# Patient Record
Sex: FEMALE | Race: WHITE | NOT HISPANIC OR LATINO | URBAN - METROPOLITAN AREA
[De-identification: names, ages, dates, MRNs, and addresses within clinical notes are randomized per-mention and may not be internally consistent; named-entity substitution may affect disease eponyms.]

---

## 2021-11-29 ENCOUNTER — HISTORICAL (OUTPATIENT)
Dept: ADMINISTRATIVE | Facility: HOSPITAL | Age: 66
End: 2021-11-29

## 2021-11-29 LAB
ABS NEUT (OLG): 4.96 X10(3)/MCL (ref 2.1–9.2)
ALBUMIN SERPL-MCNC: 4.1 GM/DL (ref 3.4–4.8)
ALBUMIN/GLOB SERPL: 1.6 RATIO (ref 1.1–2)
ALP SERPL-CCNC: 50 UNIT/L (ref 40–150)
ALT SERPL-CCNC: 34 UNIT/L (ref 0–55)
APPEARANCE, UA: CLEAR
APTT PPP: 27.2 SECOND(S) (ref 23.2–33.7)
AST SERPL-CCNC: 37 UNIT/L (ref 5–34)
BACTERIA SPEC CULT: NORMAL /HPF
BASOPHILS # BLD AUTO: 0 X10(3)/MCL (ref 0–0.2)
BASOPHILS NFR BLD AUTO: 0 %
BILIRUB SERPL-MCNC: 0.9 MG/DL
BILIRUB UR QL STRIP: NEGATIVE
BILIRUBIN DIRECT+TOT PNL SERPL-MCNC: 0.4 MG/DL (ref 0–0.5)
BILIRUBIN DIRECT+TOT PNL SERPL-MCNC: 0.5 MG/DL (ref 0–0.8)
BUN SERPL-MCNC: 10.4 MG/DL (ref 9.8–20.1)
CALCIUM SERPL-MCNC: 9.7 MG/DL (ref 8.7–10.5)
CHLORIDE SERPL-SCNC: 105 MMOL/L (ref 98–107)
CO2 SERPL-SCNC: 20 MMOL/L (ref 23–31)
COLOR UR: YELLOW
CREAT SERPL-MCNC: 0.83 MG/DL (ref 0.55–1.02)
EOSINOPHIL # BLD AUTO: 0.1 X10(3)/MCL (ref 0–0.9)
EOSINOPHIL NFR BLD AUTO: 1 %
ERYTHROCYTE [DISTWIDTH] IN BLOOD BY AUTOMATED COUNT: 13 % (ref 11.5–17)
GLOBULIN SER-MCNC: 2.5 GM/DL (ref 2.4–3.5)
GLUCOSE (UA): NEGATIVE
GLUCOSE SERPL-MCNC: 79 MG/DL (ref 82–115)
HCT VFR BLD AUTO: 41.1 % (ref 37–47)
HGB BLD-MCNC: 14 GM/DL (ref 12–16)
HGB UR QL STRIP: NEGATIVE
INR PPP: 1 (ref 0–1.3)
KETONES UR QL STRIP: NEGATIVE
LEUKOCYTE ESTERASE UR QL STRIP: NEGATIVE
LYMPHOCYTES # BLD AUTO: 3.2 X10(3)/MCL (ref 0.6–4.6)
LYMPHOCYTES NFR BLD AUTO: 35 %
MCH RBC QN AUTO: 34.4 PG (ref 27–31)
MCHC RBC AUTO-ENTMCNC: 34.1 GM/DL (ref 33–36)
MCV RBC AUTO: 101 FL (ref 80–94)
MONOCYTES # BLD AUTO: 0.8 X10(3)/MCL (ref 0.1–1.3)
MONOCYTES NFR BLD AUTO: 9 %
MRSA SCREEN BY PCR: NEGATIVE
NEUTROPHILS # BLD AUTO: 4.96 X10(3)/MCL (ref 2.1–9.2)
NEUTROPHILS NFR BLD AUTO: 54 %
NITRITE UR QL STRIP: NEGATIVE
PH UR STRIP: 7 [PH] (ref 5–9)
PLATELET # BLD AUTO: 272 X10(3)/MCL (ref 130–400)
PMV BLD AUTO: 10.8 FL (ref 9.4–12.4)
POTASSIUM SERPL-SCNC: 4.1 MMOL/L (ref 3.5–5.1)
PROT SERPL-MCNC: 6.6 GM/DL (ref 5.8–7.6)
PROT UR QL STRIP: NEGATIVE
PROTHROMBIN TIME: 13.2 SECOND(S) (ref 12.5–14.5)
RBC # BLD AUTO: 4.07 X10(6)/MCL (ref 4.2–5.4)
RBC #/AREA URNS HPF: NORMAL /[HPF]
SARS-COV-2 RNA RESP QL NAA+PROBE: NOT DETECTED
SODIUM SERPL-SCNC: 135 MMOL/L (ref 136–145)
SP GR UR STRIP: 1.01 (ref 1–1.03)
SQUAMOUS EPITHELIAL, UA: NORMAL /HPF (ref 0–4)
UROBILINOGEN UR STRIP-ACNC: 0.2
WBC # SPEC AUTO: 9.1 X10(3)/MCL (ref 4.5–11.5)
WBC #/AREA URNS HPF: NORMAL /[HPF]

## 2022-04-10 ENCOUNTER — HISTORICAL (OUTPATIENT)
Dept: ADMINISTRATIVE | Facility: HOSPITAL | Age: 67
End: 2022-04-10
Payer: MEDICARE

## 2022-04-18 ENCOUNTER — HISTORICAL (OUTPATIENT)
Dept: CARDIOLOGY | Facility: HOSPITAL | Age: 67
End: 2022-04-18
Payer: MEDICARE

## 2022-04-18 ENCOUNTER — HISTORICAL (OUTPATIENT)
Dept: ADMINISTRATIVE | Facility: HOSPITAL | Age: 67
End: 2022-04-18
Payer: MEDICARE

## 2022-04-29 VITALS
SYSTOLIC BLOOD PRESSURE: 92 MMHG | HEIGHT: 65 IN | WEIGHT: 114.44 LBS | BODY MASS INDEX: 19.07 KG/M2 | DIASTOLIC BLOOD PRESSURE: 58 MMHG

## 2023-08-10 ENCOUNTER — ANESTHESIA EVENT (OUTPATIENT)
Dept: CARDIOLOGY | Facility: HOSPITAL | Age: 68
DRG: 035 | End: 2023-08-10
Payer: MEDICARE

## 2023-08-10 RX ORDER — LOSARTAN POTASSIUM 100 MG/1
100 TABLET ORAL NIGHTLY
Status: ON HOLD | COMMUNITY
End: 2023-08-19 | Stop reason: HOSPADM

## 2023-08-10 RX ORDER — LEVOTHYROXINE SODIUM 100 UG/1
100 TABLET ORAL
COMMUNITY

## 2023-08-10 RX ORDER — ESCITALOPRAM OXALATE 5 MG/1
5 TABLET ORAL NIGHTLY
COMMUNITY
Start: 2021-12-29

## 2023-08-10 RX ORDER — ALPRAZOLAM 0.5 MG/1
0.5 TABLET ORAL 2 TIMES DAILY PRN
COMMUNITY

## 2023-08-10 RX ORDER — ATORVASTATIN CALCIUM 40 MG/1
40 TABLET, FILM COATED ORAL NIGHTLY
COMMUNITY
Start: 2021-11-03

## 2023-08-10 RX ORDER — MEGESTROL ACETATE 40 MG/ML
20 SUSPENSION ORAL DAILY
COMMUNITY

## 2023-08-10 RX ORDER — PANTOPRAZOLE SODIUM 20 MG/1
20 TABLET, DELAYED RELEASE ORAL DAILY
COMMUNITY

## 2023-08-10 RX ORDER — ASPIRIN 81 MG/1
81 TABLET ORAL DAILY
COMMUNITY
Start: 2021-12-06

## 2023-08-10 RX ORDER — ALENDRONATE SODIUM 70 MG/1
70 TABLET ORAL
COMMUNITY

## 2023-08-16 ENCOUNTER — ANESTHESIA (OUTPATIENT)
Dept: CARDIOLOGY | Facility: HOSPITAL | Age: 68
DRG: 035 | End: 2023-08-16
Payer: MEDICARE

## 2023-08-16 ENCOUNTER — HOSPITAL ENCOUNTER (INPATIENT)
Facility: HOSPITAL | Age: 68
LOS: 3 days | Discharge: HOME HEALTH | DRG: 035 | End: 2023-08-19
Attending: INTERNAL MEDICINE | Admitting: INTERNAL MEDICINE
Payer: MEDICARE

## 2023-08-16 DIAGNOSIS — I65.22 OCCLUSION OF LEFT CAROTID ARTERY: ICD-10-CM

## 2023-08-16 DIAGNOSIS — Z01.818 PREOP TESTING: ICD-10-CM

## 2023-08-16 DIAGNOSIS — I25.10 CAD (CORONARY ARTERY DISEASE): ICD-10-CM

## 2023-08-16 LAB
POC ACTIVATED CLOTTING TIME K: 101 SEC (ref 74–137)
POCT GLUCOSE: 96 MG/DL (ref 70–110)
POCT GLUCOSE: 96 MG/DL (ref 70–110)
SAMPLE: NORMAL

## 2023-08-16 PROCEDURE — 37000008 HC ANESTHESIA 1ST 15 MINUTES: Performed by: INTERNAL MEDICINE

## 2023-08-16 PROCEDURE — D9220A PRA ANESTHESIA: Mod: CRNA,,,

## 2023-08-16 PROCEDURE — D9220A PRA ANESTHESIA: ICD-10-PCS | Mod: CRNA,,,

## 2023-08-16 PROCEDURE — 25000003 PHARM REV CODE 250: Performed by: NURSE PRACTITIONER

## 2023-08-16 PROCEDURE — D9220A PRA ANESTHESIA: Mod: ANES,,, | Performed by: ANESTHESIOLOGY

## 2023-08-16 PROCEDURE — 25000003 PHARM REV CODE 250

## 2023-08-16 PROCEDURE — 63600175 PHARM REV CODE 636 W HCPCS

## 2023-08-16 PROCEDURE — 27201423 OPTIME MED/SURG SUP & DEVICES STERILE SUPPLY: Performed by: INTERNAL MEDICINE

## 2023-08-16 PROCEDURE — 25000003 PHARM REV CODE 250: Performed by: INTERNAL MEDICINE

## 2023-08-16 PROCEDURE — D9220A PRA ANESTHESIA: ICD-10-PCS | Mod: ANES,,, | Performed by: ANESTHESIOLOGY

## 2023-08-16 PROCEDURE — C1753 CATH, INTRAVAS ULTRASOUND: HCPCS | Performed by: INTERNAL MEDICINE

## 2023-08-16 PROCEDURE — 93005 ELECTROCARDIOGRAM TRACING: CPT

## 2023-08-16 PROCEDURE — 35701 PR EXPLORATION, W/O SURG REPAIR, ARTERY, NECK, CAROTID/SUBCL: ICD-10-PCS | Mod: AS,LT,, | Performed by: PHYSICIAN ASSISTANT

## 2023-08-16 PROCEDURE — C1884 EMBOLIZATION PROTECT SYST: HCPCS | Performed by: INTERNAL MEDICINE

## 2023-08-16 PROCEDURE — 21400001 HC TELEMETRY ROOM

## 2023-08-16 PROCEDURE — C1769 GUIDE WIRE: HCPCS | Performed by: INTERNAL MEDICINE

## 2023-08-16 PROCEDURE — 37000009 HC ANESTHESIA EA ADD 15 MINS: Performed by: INTERNAL MEDICINE

## 2023-08-16 PROCEDURE — 35701 EXPL N/FLWD SURG NECK ART: CPT | Mod: LT,,, | Performed by: THORACIC SURGERY (CARDIOTHORACIC VASCULAR SURGERY)

## 2023-08-16 PROCEDURE — 11000001 HC ACUTE MED/SURG PRIVATE ROOM

## 2023-08-16 PROCEDURE — C1766 INTRO/SHEATH,STRBLE,NON-PEEL: HCPCS | Performed by: INTERNAL MEDICINE

## 2023-08-16 PROCEDURE — C1894 INTRO/SHEATH, NON-LASER: HCPCS | Performed by: INTERNAL MEDICINE

## 2023-08-16 PROCEDURE — 35701 EXPL N/FLWD SURG NECK ART: CPT | Mod: AS,LT,, | Performed by: PHYSICIAN ASSISTANT

## 2023-08-16 PROCEDURE — 63600175 PHARM REV CODE 636 W HCPCS: Performed by: ANESTHESIOLOGY

## 2023-08-16 PROCEDURE — 94761 N-INVAS EAR/PLS OXIMETRY MLT: CPT

## 2023-08-16 PROCEDURE — 35701 PR EXPLORATION, W/O SURG REPAIR, ARTERY, NECK, CAROTID/SUBCL: ICD-10-PCS | Mod: LT,,, | Performed by: THORACIC SURGERY (CARDIOTHORACIC VASCULAR SURGERY)

## 2023-08-16 RX ORDER — ACETAMINOPHEN 325 MG/1
650 TABLET ORAL EVERY 4 HOURS PRN
Status: DISCONTINUED | OUTPATIENT
Start: 2023-08-16 | End: 2023-08-19 | Stop reason: HOSPADM

## 2023-08-16 RX ORDER — FENTANYL CITRATE 50 UG/ML
INJECTION, SOLUTION INTRAMUSCULAR; INTRAVENOUS
Status: DISCONTINUED | OUTPATIENT
Start: 2023-08-16 | End: 2023-08-16

## 2023-08-16 RX ORDER — KETAMINE HYDROCHLORIDE 100 MG/ML
INJECTION, SOLUTION INTRAMUSCULAR; INTRAVENOUS
Status: DISCONTINUED | OUTPATIENT
Start: 2023-08-16 | End: 2023-08-16

## 2023-08-16 RX ORDER — CLOPIDOGREL BISULFATE 75 MG/1
75 TABLET ORAL DAILY
Status: DISCONTINUED | OUTPATIENT
Start: 2023-08-17 | End: 2023-08-19 | Stop reason: HOSPADM

## 2023-08-16 RX ORDER — ROCURONIUM BROMIDE 10 MG/ML
INJECTION, SOLUTION INTRAVENOUS
Status: DISCONTINUED | OUTPATIENT
Start: 2023-08-16 | End: 2023-08-16

## 2023-08-16 RX ORDER — KETAMINE HCL IN 0.9 % NACL 50 MG/5 ML
SYRINGE (ML) INTRAVENOUS
Status: DISPENSED
Start: 2023-08-16 | End: 2023-08-16

## 2023-08-16 RX ORDER — HYDROMORPHONE HYDROCHLORIDE 2 MG/ML
0.2 INJECTION, SOLUTION INTRAMUSCULAR; INTRAVENOUS; SUBCUTANEOUS EVERY 5 MIN PRN
Status: DISCONTINUED | OUTPATIENT
Start: 2023-08-16 | End: 2023-08-16 | Stop reason: HOSPADM

## 2023-08-16 RX ORDER — HYDRALAZINE HYDROCHLORIDE 20 MG/ML
INJECTION INTRAMUSCULAR; INTRAVENOUS
Status: COMPLETED
Start: 2023-08-16 | End: 2023-08-16

## 2023-08-16 RX ORDER — PROPOFOL 10 MG/ML
VIAL (ML) INTRAVENOUS CONTINUOUS PRN
Status: DISCONTINUED | OUTPATIENT
Start: 2023-08-16 | End: 2023-08-16

## 2023-08-16 RX ORDER — ATROPINE SULFATE 0.1 MG/ML
INJECTION INTRAVENOUS
Status: DISPENSED
Start: 2023-08-16 | End: 2023-08-16

## 2023-08-16 RX ORDER — PROPOFOL 10 MG/ML
VIAL (ML) INTRAVENOUS
Status: DISCONTINUED | OUTPATIENT
Start: 2023-08-16 | End: 2023-08-16

## 2023-08-16 RX ORDER — CLOPIDOGREL BISULFATE 75 MG/1
75 TABLET ORAL
Status: ACTIVE | OUTPATIENT
Start: 2023-08-16

## 2023-08-16 RX ORDER — ACETAMINOPHEN 10 MG/ML
1000 INJECTION, SOLUTION INTRAVENOUS ONCE
Status: COMPLETED | OUTPATIENT
Start: 2023-08-16 | End: 2023-08-16

## 2023-08-16 RX ORDER — CEFAZOLIN SODIUM 1 G/3ML
INJECTION, POWDER, FOR SOLUTION INTRAMUSCULAR; INTRAVENOUS
Status: DISCONTINUED | OUTPATIENT
Start: 2023-08-16 | End: 2023-08-16

## 2023-08-16 RX ORDER — ESCITALOPRAM OXALATE 5 MG/1
5 TABLET ORAL NIGHTLY
Status: DISCONTINUED | OUTPATIENT
Start: 2023-08-16 | End: 2023-08-19 | Stop reason: HOSPADM

## 2023-08-16 RX ORDER — SODIUM CHLORIDE 9 MG/ML
INJECTION, SOLUTION INTRAVENOUS CONTINUOUS
Status: ACTIVE | OUTPATIENT
Start: 2023-08-16 | End: 2023-08-16

## 2023-08-16 RX ORDER — CLOPIDOGREL BISULFATE 75 MG/1
75 TABLET ORAL DAILY
COMMUNITY

## 2023-08-16 RX ORDER — ONDANSETRON 4 MG/1
8 TABLET, ORALLY DISINTEGRATING ORAL EVERY 8 HOURS PRN
Status: DISCONTINUED | OUTPATIENT
Start: 2023-08-16 | End: 2023-08-19 | Stop reason: HOSPADM

## 2023-08-16 RX ORDER — SODIUM CHLORIDE, SODIUM GLUCONATE, SODIUM ACETATE, POTASSIUM CHLORIDE AND MAGNESIUM CHLORIDE 30; 37; 368; 526; 502 MG/100ML; MG/100ML; MG/100ML; MG/100ML; MG/100ML
INJECTION, SOLUTION INTRAVENOUS CONTINUOUS
Status: DISCONTINUED | OUTPATIENT
Start: 2023-08-16 | End: 2023-08-19 | Stop reason: HOSPADM

## 2023-08-16 RX ORDER — ASPIRIN 81 MG/1
81 TABLET ORAL DAILY
Status: DISCONTINUED | OUTPATIENT
Start: 2023-08-17 | End: 2023-08-19 | Stop reason: HOSPADM

## 2023-08-16 RX ORDER — LIDOCAINE HYDROCHLORIDE 20 MG/ML
INJECTION, SOLUTION EPIDURAL; INFILTRATION; INTRACAUDAL; PERINEURAL
Status: DISCONTINUED | OUTPATIENT
Start: 2023-08-16 | End: 2023-08-16

## 2023-08-16 RX ORDER — LIDOCAINE HYDROCHLORIDE 10 MG/ML
1 INJECTION, SOLUTION EPIDURAL; INFILTRATION; INTRACAUDAL; PERINEURAL ONCE
Status: DISCONTINUED | OUTPATIENT
Start: 2023-08-16 | End: 2023-08-16 | Stop reason: HOSPADM

## 2023-08-16 RX ORDER — ONDANSETRON 2 MG/ML
4 INJECTION INTRAMUSCULAR; INTRAVENOUS DAILY PRN
Status: DISCONTINUED | OUTPATIENT
Start: 2023-08-16 | End: 2023-08-16 | Stop reason: HOSPADM

## 2023-08-16 RX ORDER — PSEUDOEPHEDRINE HCL 30 MG
60 TABLET ORAL 3 TIMES DAILY PRN
Status: DISCONTINUED | OUTPATIENT
Start: 2023-08-16 | End: 2023-08-19 | Stop reason: HOSPADM

## 2023-08-16 RX ORDER — ESCITALOPRAM OXALATE 5 MG/1
TABLET ORAL
Status: DISPENSED
Start: 2023-08-16 | End: 2023-08-17

## 2023-08-16 RX ORDER — ASPIRIN 81 MG/1
81 TABLET ORAL
Status: ACTIVE | OUTPATIENT
Start: 2023-08-16

## 2023-08-16 RX ORDER — DIPHENHYDRAMINE HYDROCHLORIDE 50 MG/ML
25 INJECTION INTRAMUSCULAR; INTRAVENOUS EVERY 6 HOURS PRN
Status: DISCONTINUED | OUTPATIENT
Start: 2023-08-16 | End: 2023-08-16 | Stop reason: HOSPADM

## 2023-08-16 RX ORDER — ALPRAZOLAM 0.5 MG/1
0.5 TABLET ORAL 2 TIMES DAILY PRN
Status: DISCONTINUED | OUTPATIENT
Start: 2023-08-16 | End: 2023-08-19 | Stop reason: HOSPADM

## 2023-08-16 RX ORDER — MIDAZOLAM HYDROCHLORIDE 1 MG/ML
2 INJECTION INTRAMUSCULAR; INTRAVENOUS ONCE AS NEEDED
Status: DISCONTINUED | OUTPATIENT
Start: 2023-08-16 | End: 2023-08-16 | Stop reason: HOSPADM

## 2023-08-16 RX ADMIN — ACETAMINOPHEN 650 MG: 325 TABLET, FILM COATED ORAL at 02:08

## 2023-08-16 RX ADMIN — ACETAMINOPHEN 1000 MG: 10 INJECTION, SOLUTION INTRAVENOUS at 10:08

## 2023-08-16 RX ADMIN — SODIUM CHLORIDE, SODIUM GLUCONATE, SODIUM ACETATE, POTASSIUM CHLORIDE AND MAGNESIUM CHLORIDE: 526; 502; 368; 37; 30 INJECTION, SOLUTION INTRAVENOUS at 07:08

## 2023-08-16 RX ADMIN — KETAMINE HYDROCHLORIDE 20 MG: 100 INJECTION, SOLUTION, CONCENTRATE INTRAMUSCULAR; INTRAVENOUS at 08:08

## 2023-08-16 RX ADMIN — LIDOCAINE HYDROCHLORIDE 50 MG: 20 INJECTION, SOLUTION EPIDURAL; INFILTRATION; INTRACAUDAL; PERINEURAL at 07:08

## 2023-08-16 RX ADMIN — FENTANYL CITRATE 25 MCG: 50 INJECTION, SOLUTION INTRAMUSCULAR; INTRAVENOUS at 07:08

## 2023-08-16 RX ADMIN — PROPOFOL 30 MG: 10 INJECTION, EMULSION INTRAVENOUS at 07:08

## 2023-08-16 RX ADMIN — SUGAMMADEX 200 MG: 100 INJECTION, SOLUTION INTRAVENOUS at 08:08

## 2023-08-16 RX ADMIN — ESCITALOPRAM OXALATE 5 MG: 5 TABLET, FILM COATED ORAL at 09:08

## 2023-08-16 RX ADMIN — HYDRALAZINE HYDROCHLORIDE 10 MG: 20 INJECTION INTRAMUSCULAR; INTRAVENOUS at 08:08

## 2023-08-16 RX ADMIN — ROCURONIUM BROMIDE 50 MG: 10 SOLUTION INTRAVENOUS at 07:08

## 2023-08-16 RX ADMIN — SODIUM CHLORIDE: 9 INJECTION, SOLUTION INTRAVENOUS at 10:08

## 2023-08-16 RX ADMIN — CEFAZOLIN 1 G: 330 INJECTION, POWDER, FOR SOLUTION INTRAMUSCULAR; INTRAVENOUS at 08:08

## 2023-08-16 RX ADMIN — PROPOFOL 50 MCG/KG/MIN: 10 INJECTION, EMULSION INTRAVENOUS at 07:08

## 2023-08-16 RX ADMIN — PHENYLEPHRINE HYDROCHLORIDE 40 MCG/KG/MIN: 10 INJECTION INTRAVENOUS at 08:08

## 2023-08-16 RX ADMIN — FENTANYL CITRATE 50 MCG: 50 INJECTION, SOLUTION INTRAMUSCULAR; INTRAVENOUS at 08:08

## 2023-08-16 NOTE — Clinical Note
The site was marked. The groin, left chest and left neck was prepped. The site was prepped with ChloraPrep. The site was clipped. The patient was draped.

## 2023-08-16 NOTE — Clinical Note
L carotid artery exposed, artery too calcified to proceed with procedure in cath lab. Procedure aborted per MD.

## 2023-08-16 NOTE — ANESTHESIA PROCEDURE NOTES
Intubation    Date/Time: 8/16/2023 8:02 AM    Performed by: Clay Sheldon CRNA  Authorized by: Avtar Little MD    Intubation:     Induction:  Intravenous    Intubated:  Postinduction    Mask Ventilation:  Easy mask    Attempts:  1    Attempted By:  Student    Method of Intubation:  Video laryngoscopy    Blade:  Fry 3    Laryngeal View Grade: Grade I - full view of cords      Difficult Airway Encountered?: No      Complications:  None    Airway Device:  Oral endotracheal tube    Airway Device Size:  6.5    Style/Cuff Inflation:  Cuffed (inflated to minimal occlusive pressure)    Tube secured:  21    Secured at:  The lips    Placement Verified By:  Capnometry    Complicating Factors:  None    Findings Post-Intubation:  BS equal bilateral and atraumatic/condition of teeth unchanged

## 2023-08-16 NOTE — OP NOTE
[facility] - [facility] Medical Telemetry  Cardiothoracic Surgery  Operative Note    SUMMARY     Date of Procedure: 8/16/2023     Procedure:  1.  Attempted left carotid TCAR procedure    Co-Surgeon: FEI Sanford / Mio     Assistant: Jayy Vegas    Pre-Operative Diagnosis:  Severe bilateral internal carotid artery stenosis    Post-Operative Diagnosis:  Severe bilateral internal carotid artery stenosis with severe left carotid calcification down to a level of just below the clavicle and the common carotid artery.    Anesthesia: General    Operative Findings (including complications, if any):  Patient with severe anterior calcification of the common carotid artery obviating the possibility of access for left carotid TCAR procedure    Description of Technical Procedures:  Patient was lived to the operating room, support lines were placed, general endotracheal anesthesia was induced.  Patient was prepped and draped usual sterile fashion.  A 2 cm incision was made just above the clavicle and anterior to the left common carotid artery.  Subcutaneous tissues were dissected with the Bovie electric cautery.  The strap muscles were split along the direction of the fibers and the common carotid artery was identified.  Patient was found to have a hard calcification anterior on the common carotid artery which extended into the chest.  This obviating the possibility of access for transcarotid arterial revascularization and the procedure was aborted due to safety concerns.  Wounds were irrigated with saline and closed in layers with Vicryl.  Patient tolerated the procedure well be delivered to the recovery room in stable condition.    Estimated Blood Loss (EBL):  5 mL          Specimens:   Specimen (24h ago, onward)      None                    Condition: Stable    Disposition: PACU - hemodynamically stable.

## 2023-08-16 NOTE — NURSING
Nurses Note -- 4 Eyes      8/16/2023   11:43 AM      Skin assessed during: Admit      [x] No Altered Skin Integrity Present    [x]Prevention Measures Documented      [] Yes- Altered Skin Integrity Present or Discovered   [] LDA Added if Not in Epic (Describe Wound)   [] New Altered Skin Integrity was Present on Admit and Documented in LDA   [] Wound Image Taken    Wound Care Consulted? No    Attending Nurse:   Torri RAMIREZ RN    Second RN/Staff Member:   Keesha Lr RN

## 2023-08-16 NOTE — ANESTHESIA PREPROCEDURE EVALUATION
08/16/2023  Magalis Bridges is a 67 y.o., female with multiple medical problems including CAD s/p CABG, and sever carotid stenosis, worse on the left.  She is here today for carotid stent on left and angiogram of right carotid in cath lab.  Severely reduced LV EF of 25 % noted.  She is in good spirits in holding, laughing and joking with family, a bit hard of hearing.  She does appear somewhat frail, but asks appropriate questions.  No cardiac complaints.  Breathing well today.      Pre-op Assessment    I have reviewed the Patient Summary Reports.     I have reviewed the Nursing Notes. I have reviewed the NPO Status.   I have reviewed the Medications.     Review of Systems  Anesthesia Hx:  No problems with previous Anesthesia  Denies Family Hx of Anesthesia complications.   Denies Personal Hx of Anesthesia complications.   Hematology/Oncology:         -- Anemia:   Cardiovascular:   Exercise tolerance: poor Hypertension CAD  CABG/stent  PVD    Pulmonary:  Pulmonary Normal    Hepatic/GI:   GERD        Physical Exam  General: Well nourished, Cooperative, Alert and Oriented    Airway:  Mallampati: II   Mouth Opening: Normal  TM Distance: Normal  Tongue: Normal  Neck ROM: Normal ROM    Dental:  Periodontal disease, Loose teeth  Missing teeth.  Poor dentition.  Chest/Lungs:  Clear to auscultation, Normal Respiratory Rate    Heart:  Rate: Normal  Rhythm: Regular Rhythm        Anesthesia Plan  Type of Anesthesia, risks & benefits discussed:    Anesthesia Type: Gen ETT  Intra-op Monitoring Plan: Standard ASA Monitors and Art Line  Post Op Pain Control Plan: multimodal analgesia  Induction:  IV  Informed Consent: Informed consent signed with the Patient and all parties understand the risks and agree with anesthesia plan.  All questions answered. Patient consented to blood products? Yes  ASA Score: 4  Day of Surgery  Review of History & Physical: H&P Update referred to the surgeon/provider.  Anesthesia Plan Notes: TIVA.    Ready For Surgery From Anesthesia Perspective.     .

## 2023-08-16 NOTE — CONSULTS
[facility] - [facility] Telemetry  Cardiothoracic Surgery  Consult Note    Patient Name: Magalis Bridges  MRN: [id number]  Admission Date: 8/16/2023  Attending Physician: Kulwant Lieberman MD  Referring Provider: Olayinka Garcia MD    Patient information was obtained from patient, past medical records, and primary team.     Inpatient consult to Cardiothoracic Surgery  Consult performed by: Cheikh Sanford MD  Consult ordered by: Kulwant Lieberman MD  Reason for consult: Bilateral internal carotid artery stenosis  Assessment/Recommendations: The patient has severe bilateral internal carotid artery stenosis with heavy calcification.  Recommend left TCAR procedure followed by possible right TCAR procedure.  Risks, benefits, and alternatives have been discussed.  Questions have been answered.  Patient voices understanding and agrees to proceed.        Subjective:     Chief Complaint/Reason for Admission:  Bilateral internal carotid artery stenosis    History of Present Illness:  Patient is a 67-year-old woman with a history of coronary artery disease having had coronary artery bypass grafting now comes with significant ultrasound evidence bilateral internal carotid artery stenosis.  Left internal carotid artery has been found to have a peak systolic velocity of 286 centimeters/second.  CTA is indicative of bilateral greater than 70% internal carotid artery stenoses     The patient denies fever, chills, nausea, vomiting, headache, syncope, chest pain, back pain, or palpitations.    No current facility-administered medications on file prior to encounter.     Current Outpatient Medications on File Prior to Encounter   Medication Sig    alendronate (FOSAMAX) 70 MG tablet Take 70 mg by mouth every 7 days.    ALPRAZolam (XANAX) 0.5 MG tablet Take 0.5 mg by mouth 2 (two) times daily as needed for Anxiety.    aspirin (ECOTRIN) 81 MG EC tablet Take 81 mg by mouth once daily.    atorvastatin (LIPITOR) 40 MG tablet Take 40  mg by mouth every evening.    clopidogreL (PLAVIX) 75 mg tablet Take 75 mg by mouth once daily.    EScitalopram oxalate (LEXAPRO) 5 MG Tab Take 5 mg by mouth nightly.    levothyroxine (SYNTHROID) 100 MCG tablet Take 100 mcg by mouth before breakfast.    losartan (COZAAR) 100 MG tablet Take 100 mg by mouth every evening.    megestroL (MEGACE) 400 mg/10 mL (40 mg/mL) Susp Take 20 mLs by mouth once daily.    metoprolol succinate 25 mg CSpX Take 75 mg by mouth once daily.    pantoprazole (PROTONIX) 20 MG tablet Take 20 mg by mouth once daily.    rivaroxaban (XARELTO) 10 mg Tab Take 10 mg by mouth daily with dinner or evening meal.       Review of patient's allergies indicates:   Allergen Reactions    Codeine Nausea Only       Past Medical History:   Diagnosis Date    Anxiety     Appetite loss     Arthritis     Carotid arterial disease     Coronary artery disease     DVT (deep venous thrombosis) 11/2021    GERD (gastroesophageal reflux disease)     HLD (hyperlipidemia)     Hypertension     Osteoporosis     Thyroid disease      Past Surgical History:   Procedure Laterality Date    CATARACT EXTRACTION Bilateral     CORONARY ARTERY BYPASS GRAFT  2021    INSERTION OF BIVENTRICULAR IMPLANTABLE CARDIOVERTER-DEFIBRILLATOR (ICD)      TONSILLECTOMY       Family History    None       Tobacco Use    Smoking status: Every Day     Current packs/day: 1.00     Types: Cigarettes    Smokeless tobacco: Never   Substance and Sexual Activity    Alcohol use: Yes     Alcohol/week: 14.0 - 21.0 standard drinks of alcohol     Types: 14 - 21 Cans of beer per week     Comment: 3-4 beers/day    Drug use: Yes     Types: Marijuana    Sexual activity: Not Currently     Review of Systems   Constitutional:  Negative for chills, diaphoresis, fatigue and fever.   HENT:  Negative for dental problem.    Respiratory:  Negative for cough, chest tightness, shortness of breath, wheezing and stridor.    Cardiovascular:  Negative for chest pain, palpitations and  leg swelling.   Gastrointestinal:  Negative for abdominal distention, abdominal pain, constipation, diarrhea, nausea and vomiting.   Musculoskeletal:  Negative for back pain, neck pain and neck stiffness.   Skin:  Negative for wound.   Neurological:  Negative for dizziness, weakness, numbness and headaches.   Hematological:  Negative for adenopathy.     Objective:     Vital Signs (Most Recent):  Temp: 98.4 °F (36.9 °C) (08/16/23 1132)  Pulse: 95 (08/16/23 1132)  Resp: 18 (08/16/23 1132)  BP: (!) 94/51 (08/16/23 1132)  SpO2: 96 % (08/16/23 1132) Vital Signs (24h Range):  Temp:  [97.6 °F (36.4 °C)-98.4 °F (36.9 °C)] 98.4 °F (36.9 °C)  Pulse:  [77-95] 95  Resp:  [16-23] 18  SpO2:  [93 %-99 %] 96 %  BP: ()/(48-82) 94/51  Arterial Line BP: (138-229)/(46-72) 138/46     Weight: 57.4 kg (126 lb 8.7 oz)  Body mass index is 20.74 kg/m².    SpO2: 96 %        Intake/Output - Last 3 Shifts         08/14 0700  08/15 0659 08/15 0700  08/16 0659 08/16 0700 08/17 0659    IV Piggyback   400    Total Intake(mL/kg)   400 (7)    Net   +400                    Lines/Drains/Airways       Peripheral Intravenous Line  Duration                  Peripheral IV - Single Lumen 08/16/23 0630 20 G Anterior;Distal;Right Forearm <1 day         Peripheral IV - Single Lumen 08/16/23 0805 20 G Left Hand <1 day                     STS Risk Score: n/a    Physical Exam  Vitals and nursing note reviewed.   Constitutional:       General: She is not in acute distress.     Appearance: Normal appearance.   HENT:      Head: Normocephalic and atraumatic.      Nose: Nose normal. No congestion.      Mouth/Throat:      Mouth: Mucous membranes are moist.   Eyes:      General: No scleral icterus.     Extraocular Movements: Extraocular movements intact.      Conjunctiva/sclera: Conjunctivae normal.      Pupils: Pupils are equal, round, and reactive to light.   Neck:      Thyroid: No thyroid mass or thyromegaly.      Vascular: Carotid bruit (Bilateral) present.  "No JVD.   Cardiovascular:      Rate and Rhythm: Normal rate and regular rhythm.      Pulses: Normal pulses.           Carotid pulses are 2+ on the right side and 2+ on the left side.       Radial pulses are 2+ on the right side and 2+ on the left side.        Femoral pulses are 2+ on the right side and 2+ on the left side.       Dorsalis pedis pulses are 2+ on the right side and 2+ on the left side.        Posterior tibial pulses are 2+ on the right side and 2+ on the left side.      Heart sounds: No murmur heard.     No friction rub. No gallop.   Pulmonary:      Effort: Pulmonary effort is normal. No respiratory distress.      Breath sounds: Normal breath sounds. No stridor. No wheezing, rhonchi or rales.   Chest:      Chest wall: No tenderness.   Abdominal:      General: Abdomen is flat. Bowel sounds are normal. There is no distension.      Palpations: Abdomen is soft. There is no hepatomegaly, splenomegaly, mass or pulsatile mass.      Tenderness: There is no abdominal tenderness. There is no rebound.   Musculoskeletal:         General: No swelling. Normal range of motion.      Cervical back: Normal range of motion. No rigidity or tenderness.      Right lower leg: No edema.      Left lower leg: No edema.   Lymphadenopathy:      Cervical: No cervical adenopathy.      Upper Body:      Right upper body: No supraclavicular or axillary adenopathy.      Left upper body: No supraclavicular or axillary adenopathy.   Skin:     General: Skin is warm and dry.   Neurological:      General: No focal deficit present.      Mental Status: She is alert and oriented to person, place, and time. Mental status is at baseline.      Cranial Nerves: No cranial nerve deficit.      Sensory: No sensory deficit.      Motor: No weakness.      Gait: Gait normal.   Psychiatric:         Mood and Affect: Mood normal.         Significant Labs:  BMP: No results for input(s): "GLU", "NA", "K", "CL", "CO2", "BUN", "CREATININE", "CALCIUM", "MG" in " "the last 48 hours.  CBC: No results for input(s): "WBC", "RBC", "HGB", "HCT", "PLT", "MCV", "MCH", "MCHC" in the last 48 hours.  All pertinent labs from the last 24 hours have been reviewed.    Significant Diagnostics:  CT: I have reviewed all pertinent results/findings within the past 24 hours  U/S: I have reviewed all pertinent results/findings within the past 24 hours  I have reviewed and interpreted all pertinent imaging results/findings within the past 24 hours.    Assessment/Plan:     NYHA Score: NYHA I: cardiac disease, but without resulting limitations of physical activity    There are no hospital problems to display for this patient.      Thank you for your consult. I will follow-up with patient. Please contact us if you have any additional questions.    Cheikh Sanford MD  Cardiothoracic Surgery  [facility] - [facility] Tele[facility]  "

## 2023-08-16 NOTE — LETTER
August 21, 2023        Olayinka Garcia MD  [address]  [place] [address]                [address] [facility] [address]   Patient: Magalis Bridges   MR Number: [id number]   YOB: 1955   Date of Visit: 8/9/2023       This patient needs a follow up appointment. Please schedule an appointment for this pt and contact the patient with appointment date and time.

## 2023-08-16 NOTE — TRANSFER OF CARE
"Anesthesia Transfer of Care Note    Patient: Magalis Bridges    Procedure(s) Performed: Procedure(s) (LRB):  Stent, Carotid W/ Protect (N/A)    Patient location: PACU    Anesthesia Type: general    Transport from OR: Transported from OR on room air with adequate spontaneous ventilation    Post pain: adequate analgesia    Post assessment: no apparent anesthetic complications    Post vital signs: stable    Level of consciousness: awake and alert    Nausea/Vomiting: no nausea/vomiting    Complications: none    Transfer of care protocol was followed      Last vitals:   Visit Vitals  BP (!) 166/82   Pulse 77   Temp 36.4 °C (97.6 °F) (Oral)   Ht 5' 5.5" (1.664 m)   Wt 57.4 kg (126 lb 8.7 oz)   LMP  (LMP Unknown)   SpO2 99%   Breastfeeding No   BMI 20.74 kg/m²     "

## 2023-08-16 NOTE — Clinical Note
The catheter was inserted into the  proximal left common carotid artery. Catheter advanced for angio Left ICA.

## 2023-08-16 NOTE — Clinical Note
The catheter was inserted into the and was inserted over the wire into the. Into common cartoid for angio

## 2023-08-16 NOTE — PLAN OF CARE
08/16/23 1517   Discharge Assessment   Assessment Type Discharge Planning Assessment   Confirmed/corrected address, phone number and insurance Yes   Confirmed Demographics Correct on Facesheet   Source of Information patient;family   Communicated EDIE with patient/caregiver Date not available/Unable to determine   Reason For Admission Occlusion of left carotid artery   People in Home alone   Do you expect to return to your current living situation? Yes   Do you have help at home or someone to help you manage your care at home? No   Prior to hospitilization cognitive status: Unable to Assess   Current cognitive status: Alert/Oriented   Walking or Climbing Stairs ambulation difficulty, requires equipment   Mobility Management rolling walker, wheel chair   Dressing/Bathing bathing difficulty, requires equipment   Dressing/Bathing Management Bath bench   Home Accessibility stairs to enter home   Number of Stairs, Main Entrance three   Home Layout Able to live on 1st floor   Equipment Currently Used at Home bath bench;bedside commode;wheelchair;walker, rolling   Do you currently have service(s) that help you manage your care at home? Yes   Name and Contact number of agency [facility]   Is the pt/caregiver preference to resume services with current agency Yes   Do you take prescription medications? Yes   Do you have prescription coverage? Yes   Coverage medicare   Who is going to help you get home at discharge? [facility] [phone]   How do you get to doctors appointments? family or friend will provide   Are you on dialysis? No   Do you take coumadin? No   DME Needed Upon Discharge  none   Discharge Plan discussed with: Patient   Transition of Care Barriers None   Discharge Plan A Home Health   Discharge Plan B Home Health   Physical Activity   On average, how many days per week do you engage in moderate to strenuous exercise (like a brisk walk)? 0 days   On average, how many minutes do you engage in  exercise at this level? 0 min   Financial Resource Strain   How hard is it for you to pay for the very basics like food, housing, medical care, and heating? Not hard   Housing Stability   In the last 12 months, was there a time when you were not able to pay the mortgage or rent on time? N   In the last 12 months, how many places have you lived? 1   In the last 12 months, was there a time when you did not have a steady place to sleep or slept in a shelter (including now)? N   Transportation Needs   In the past 12 months, has lack of transportation kept you from medical appointments or from getting medications? no   In the past 12 months, has lack of transportation kept you from meetings, work, or from getting things needed for daily living? No   Food Insecurity   Within the past 12 months, you worried that your food would run out before you got the money to buy more. Never true   Within the past 12 months, the food you bought just didn't last and you didn't have money to get more. Never true   Stress   Do you feel stress - tense, restless, nervous, or anxious, or unable to sleep at night because your mind is troubled all the time - these days? Not at all   Social Connections   In a typical week, how many times do you talk on the phone with family, friends, or neighbors? More than 3   How often do you get together with friends or relatives? More than 3   How often do you attend Restorationist or Latter day services? Never   Do you belong to any clubs or organizations such as Restorationist groups, unions, fraternal or athletic groups, or school groups? No   How often do you attend meetings of the clubs or organizations you belong to? Never   Are you , , , , never , or living with a partner? Never marrie   Alcohol Use   Q1: How often do you have a drink containing alcohol? 4 or more ti   Q2: How many drinks containing alcohol do you have on a typical day when you are drinking? 3 or 4   Q3: How  often do you have six or more drinks on one occasion? Never   Patient states she is current with [facility]. Cm called agency, end of episode was 8/12. Patient will need new home health orders on discharge. She would like to continue services with [facility].

## 2023-08-17 LAB
ANION GAP SERPL CALC-SCNC: 9 MEQ/L
BASOPHILS # BLD AUTO: 0.03 X10(3)/MCL
BASOPHILS NFR BLD AUTO: 0.3 %
BUN SERPL-MCNC: 10.9 MG/DL (ref 9.8–20.1)
CALCIUM SERPL-MCNC: 8.5 MG/DL (ref 8.4–10.2)
CHLORIDE SERPL-SCNC: 106 MMOL/L (ref 98–107)
CO2 SERPL-SCNC: 19 MMOL/L (ref 23–31)
CREAT SERPL-MCNC: 0.76 MG/DL (ref 0.55–1.02)
CREAT/UREA NIT SERPL: 14
EOSINOPHIL # BLD AUTO: 0.07 X10(3)/MCL (ref 0–0.9)
EOSINOPHIL NFR BLD AUTO: 0.8 %
ERYTHROCYTE [DISTWIDTH] IN BLOOD BY AUTOMATED COUNT: 13.5 % (ref 11.5–17)
GFR SERPLBLD CREATININE-BSD FMLA CKD-EPI: >60 MLS/MIN/1.73/M2
GLUCOSE SERPL-MCNC: 131 MG/DL (ref 70–110)
GLUCOSE SERPL-MCNC: 91 MG/DL (ref 82–115)
HCT VFR BLD AUTO: 34.7 % (ref 37–47)
HGB BLD-MCNC: 11.9 G/DL (ref 12–16)
IMM GRANULOCYTES # BLD AUTO: 0.1 X10(3)/MCL (ref 0–0.04)
IMM GRANULOCYTES NFR BLD AUTO: 1.1 %
LYMPHOCYTES # BLD AUTO: 2.45 X10(3)/MCL (ref 0.6–4.6)
LYMPHOCYTES NFR BLD AUTO: 27.8 %
MCH RBC QN AUTO: 33.3 PG (ref 27–31)
MCHC RBC AUTO-ENTMCNC: 34.3 G/DL (ref 33–36)
MCV RBC AUTO: 97.2 FL (ref 80–94)
MONOCYTES # BLD AUTO: 0.85 X10(3)/MCL (ref 0.1–1.3)
MONOCYTES NFR BLD AUTO: 9.6 %
NEUTROPHILS # BLD AUTO: 5.31 X10(3)/MCL (ref 2.1–9.2)
NEUTROPHILS NFR BLD AUTO: 60.4 %
NRBC BLD AUTO-RTO: 0 %
PLATELET # BLD AUTO: 257 X10(3)/MCL (ref 130–400)
PMV BLD AUTO: 10.2 FL (ref 7.4–10.4)
POCT GLUCOSE: 96 MG/DL (ref 70–110)
POTASSIUM SERPL-SCNC: 4.4 MMOL/L (ref 3.5–5.1)
RBC # BLD AUTO: 3.57 X10(6)/MCL (ref 4.2–5.4)
SODIUM SERPL-SCNC: 134 MMOL/L (ref 136–145)
WBC # SPEC AUTO: 8.81 X10(3)/MCL (ref 4.5–11.5)

## 2023-08-17 PROCEDURE — 85025 COMPLETE CBC W/AUTO DIFF WBC: CPT | Performed by: INTERNAL MEDICINE

## 2023-08-17 PROCEDURE — 99152 MOD SED SAME PHYS/QHP 5/>YRS: CPT | Performed by: INTERNAL MEDICINE

## 2023-08-17 PROCEDURE — 37215 TRANSCATH STENT CCA W/EPS: CPT | Performed by: INTERNAL MEDICINE

## 2023-08-17 PROCEDURE — 27000221 HC OXYGEN, UP TO 24 HOURS

## 2023-08-17 PROCEDURE — C1725 CATH, TRANSLUMIN NON-LASER: HCPCS | Performed by: INTERNAL MEDICINE

## 2023-08-17 PROCEDURE — C1769 GUIDE WIRE: HCPCS | Performed by: INTERNAL MEDICINE

## 2023-08-17 PROCEDURE — C1894 INTRO/SHEATH, NON-LASER: HCPCS | Performed by: INTERNAL MEDICINE

## 2023-08-17 PROCEDURE — 25000003 PHARM REV CODE 250: Performed by: NURSE PRACTITIONER

## 2023-08-17 PROCEDURE — 25500020 PHARM REV CODE 255: Performed by: INTERNAL MEDICINE

## 2023-08-17 PROCEDURE — 99153 MOD SED SAME PHYS/QHP EA: CPT | Performed by: INTERNAL MEDICINE

## 2023-08-17 PROCEDURE — 25000003 PHARM REV CODE 250: Performed by: INTERNAL MEDICINE

## 2023-08-17 PROCEDURE — 80048 BASIC METABOLIC PNL TOTAL CA: CPT | Performed by: INTERNAL MEDICINE

## 2023-08-17 PROCEDURE — C1887 CATHETER, GUIDING: HCPCS | Performed by: INTERNAL MEDICINE

## 2023-08-17 PROCEDURE — 94761 N-INVAS EAR/PLS OXIMETRY MLT: CPT

## 2023-08-17 PROCEDURE — 63600175 PHARM REV CODE 636 W HCPCS: Performed by: NURSE PRACTITIONER

## 2023-08-17 PROCEDURE — 27201423 OPTIME MED/SURG SUP & DEVICES STERILE SUPPLY: Performed by: INTERNAL MEDICINE

## 2023-08-17 PROCEDURE — C1876 STENT, NON-COA/NON-COV W/DEL: HCPCS | Performed by: INTERNAL MEDICINE

## 2023-08-17 PROCEDURE — C1884 EMBOLIZATION PROTECT SYST: HCPCS | Performed by: INTERNAL MEDICINE

## 2023-08-17 PROCEDURE — 21400001 HC TELEMETRY ROOM

## 2023-08-17 PROCEDURE — 63600175 PHARM REV CODE 636 W HCPCS: Performed by: INTERNAL MEDICINE

## 2023-08-17 DEVICE — XACT CAROTID STENT SYSTEM 9.0 MM X 40 MM TAPERED
Type: IMPLANTABLE DEVICE | Site: NECK | Status: FUNCTIONAL
Brand: XACT

## 2023-08-17 RX ORDER — LIDOCAINE HYDROCHLORIDE 10 MG/ML
INJECTION, SOLUTION EPIDURAL; INFILTRATION; INTRACAUDAL; PERINEURAL
Status: DISCONTINUED | OUTPATIENT
Start: 2023-08-17 | End: 2023-08-17 | Stop reason: HOSPADM

## 2023-08-17 RX ORDER — METOPROLOL TARTRATE 1 MG/ML
INJECTION, SOLUTION INTRAVENOUS
Status: DISCONTINUED | OUTPATIENT
Start: 2023-08-17 | End: 2023-08-17 | Stop reason: HOSPADM

## 2023-08-17 RX ORDER — PSEUDOEPHEDRINE HCL 30 MG
60 TABLET ORAL ONCE
Status: DISCONTINUED | OUTPATIENT
Start: 2023-08-17 | End: 2023-08-19 | Stop reason: HOSPADM

## 2023-08-17 RX ORDER — HYDRALAZINE HYDROCHLORIDE 20 MG/ML
INJECTION INTRAMUSCULAR; INTRAVENOUS
Status: DISCONTINUED | OUTPATIENT
Start: 2023-08-17 | End: 2023-08-17 | Stop reason: HOSPADM

## 2023-08-17 RX ORDER — HYDRALAZINE HYDROCHLORIDE 20 MG/ML
10 INJECTION INTRAMUSCULAR; INTRAVENOUS EVERY 4 HOURS PRN
Status: DISCONTINUED | OUTPATIENT
Start: 2023-08-17 | End: 2023-08-19 | Stop reason: HOSPADM

## 2023-08-17 RX ORDER — ATROPINE SULFATE 0.1 MG/ML
INJECTION INTRAVENOUS
Status: DISCONTINUED | OUTPATIENT
Start: 2023-08-17 | End: 2023-08-17 | Stop reason: HOSPADM

## 2023-08-17 RX ORDER — MIDODRINE HYDROCHLORIDE 5 MG/1
10 TABLET ORAL ONCE
Status: DISCONTINUED | OUTPATIENT
Start: 2023-08-17 | End: 2023-08-19 | Stop reason: HOSPADM

## 2023-08-17 RX ORDER — SODIUM CHLORIDE 9 MG/ML
INJECTION, SOLUTION INTRAVENOUS CONTINUOUS
Status: DISCONTINUED | OUTPATIENT
Start: 2023-08-17 | End: 2023-08-19 | Stop reason: HOSPADM

## 2023-08-17 RX ORDER — NITROGLYCERIN 20 MG/100ML
INJECTION INTRAVENOUS
Status: DISCONTINUED | OUTPATIENT
Start: 2023-08-17 | End: 2023-08-17 | Stop reason: HOSPADM

## 2023-08-17 RX ORDER — MIDODRINE HYDROCHLORIDE 5 MG/1
10 TABLET ORAL 3 TIMES DAILY PRN
Status: DISCONTINUED | OUTPATIENT
Start: 2023-08-17 | End: 2023-08-19 | Stop reason: HOSPADM

## 2023-08-17 RX ORDER — EPINEPHRINE 0.1 MG/ML
INJECTION INTRAVENOUS
Status: DISCONTINUED | OUTPATIENT
Start: 2023-08-17 | End: 2023-08-17 | Stop reason: HOSPADM

## 2023-08-17 RX ORDER — ONDANSETRON 2 MG/ML
INJECTION INTRAMUSCULAR; INTRAVENOUS
Status: DISCONTINUED | OUTPATIENT
Start: 2023-08-17 | End: 2023-08-17 | Stop reason: HOSPADM

## 2023-08-17 RX ORDER — MIDODRINE HYDROCHLORIDE 5 MG/1
10 TABLET ORAL
Status: DISPENSED | OUTPATIENT
Start: 2023-08-17 | End: 2023-08-18

## 2023-08-17 RX ORDER — HEPARIN SODIUM 1000 [USP'U]/ML
INJECTION, SOLUTION INTRAVENOUS; SUBCUTANEOUS
Status: DISCONTINUED | OUTPATIENT
Start: 2023-08-17 | End: 2023-08-17 | Stop reason: HOSPADM

## 2023-08-17 RX ORDER — ACETAMINOPHEN 10 MG/ML
1000 INJECTION, SOLUTION INTRAVENOUS ONCE
Status: COMPLETED | OUTPATIENT
Start: 2023-08-17 | End: 2023-08-17

## 2023-08-17 RX ADMIN — ONDANSETRON 8 MG: 4 TABLET, ORALLY DISINTEGRATING ORAL at 04:08

## 2023-08-17 RX ADMIN — MIDODRINE HYDROCHLORIDE 10 MG: 5 TABLET ORAL at 05:08

## 2023-08-17 RX ADMIN — ALPRAZOLAM 0.5 MG: 0.5 TABLET ORAL at 05:08

## 2023-08-17 RX ADMIN — SODIUM CHLORIDE: 9 INJECTION, SOLUTION INTRAVENOUS at 02:08

## 2023-08-17 RX ADMIN — MIDODRINE HYDROCHLORIDE 10 MG: 5 TABLET ORAL at 09:08

## 2023-08-17 RX ADMIN — ACETAMINOPHEN 1000 MG: 10 INJECTION, SOLUTION INTRAVENOUS at 03:08

## 2023-08-17 RX ADMIN — CLOPIDOGREL BISULFATE 75 MG: 75 TABLET ORAL at 09:08

## 2023-08-17 RX ADMIN — PSEUDOEPHEDRINE HCL 60 MG: 30 TABLET, FILM COATED ORAL at 03:08

## 2023-08-17 RX ADMIN — ESCITALOPRAM OXALATE 5 MG: 5 TABLET, FILM COATED ORAL at 09:08

## 2023-08-17 RX ADMIN — ASPIRIN 81 MG: 81 TABLET, COATED ORAL at 09:08

## 2023-08-17 RX ADMIN — MIDODRINE HYDROCHLORIDE 10 MG: 5 TABLET ORAL at 03:08

## 2023-08-17 NOTE — PROGRESS NOTES
[facility]    Cardiology  Progress Note    Patient Name: Magalis Bridges  MRN: [id number]  Admission Date: 8/16/2023  Hospital Length of Stay: 1 days  Code Status: No Order   Attending Physician: Kulwant Lieberman MD   Primary Care Physician: Olayinka Garcia MD  Expected Discharge Date:   Principal Problem:<principal problem not specified>    Subjective:     Brief HPI/Hospital Course: Ms. Bridges is a 66 y/o female who is known to [facility], Dr. Lieberman. She was recently worked up for CARIDAD and Deemed a Candidate for Carotid Stenting via TCAR Approach. On 8.16.23 she underwent a TCAR that was unsuccessful and she was stabilized and admitted to Telemetry with plans for a Transfemoral Carotid Stenting on 8.17.23. She remains NPO in preparation for this Traditional Carotid Stenting.    PMH: CAD/CABG, CARIDAD/Bilaterally Mod-Severe, Chronic Combined Systolic/Diastolic HF/EF 25, PAD, HTN, HLD, DVT, Hypothyroidism, Tobacco Use  PSH: Angiograms, CABG   Family History: Father, D, MI; Mother, D, HTN  Social History: Denies Illicit Drug, ETOH and Tobacco Use; Former Smoker, Quit Years Prior     Previous Diagnostics:  Carotid US 4.10.23:  The study quality is average.   60-79% stenosis in the mid right internal carotid artery based on Bluth Criteria.   80-99% stenosis in the proximal left internal carotid artery based on Bluth Criteria.   Antegrade right vertebral artery flow.   Antegrade left vertebral artery flow.     ECHO 10.26.22:  The study quality is good.   Global left ventricular systolic function is severely decreased.  The left ventricular ejection fraction is 25%. Left ventricular diastolic function is indeterminate. Noted left ventricular hypertrophy. Concentric left ventricular hypertrophy is present. It is mild.  LVEF was obtained via 2D measurement, Fung's Rule, and LV Strain.   The left atrial diameter is mildly increased.   Mild (1+) mitral regurgitation. Mild (1+) tricuspid regurgitation.  Normal pulmonary artery  pressure.    Review of Systems   Constitutional: Positive for malaise/fatigue.   Cardiovascular:  Negative for chest pain, irregular heartbeat, leg swelling and orthopnea.   Respiratory:  Negative for shortness of breath.    All other systems reviewed and are negative.    Objective:     Vital Signs (Most Recent):  Temp: 98.3 °F (36.8 °C) (08/17/23 1505)  Pulse: 85 (08/17/23 1531)  Resp: 19 (08/17/23 0446)  BP: 111/62 (08/17/23 1531)  SpO2: 98 % (08/17/23 1137) Vital Signs (24h Range):  Temp:  [97.6 °F (36.4 °C)-98.3 °F (36.8 °C)] 98.3 °F (36.8 °C)  Pulse:  [] 85  Resp:  [18-19] 19  SpO2:  [96 %-98 %] 98 %  BP: ()/(31-86) 111/62     Weight: 59.2 kg (130 lb 8.2 oz)  Body mass index is 21.39 kg/m².    SpO2: 98 %         Intake/Output Summary (Last 24 hours) at 8/17/2023 1613  Last data filed at 8/17/2023 1336  Gross per 24 hour   Intake 0 ml   Output 1250 ml   Net -1250 ml     Lines/Drains/Airways       Line  Duration                  Sheath 08/17/23 1235 Right anterior;proximal <1 day              Peripheral Intravenous Line  Duration                  Peripheral IV - Single Lumen 08/16/23 0630 20 G Anterior;Distal;Right Forearm 1 day         Peripheral IV - Single Lumen 08/16/23 0805 20 G Left Hand 1 day                  Significant Labs:   Recent Results (from the past 72 hour(s))   ISTAT ACT-K    Collection Time: 08/16/23  9:02 AM   Result Value Ref Range    POC ACTIVATED CLOTTING TIME K 101 74 - 137 sec    Sample unknown    POCT glucose    Collection Time: 08/16/23  9:19 AM   Result Value Ref Range    POCT Glucose 96 70 - 110 mg/dL   POCT glucose    Collection Time: 08/16/23  4:06 PM   Result Value Ref Range    POCT Glucose 96 70 - 110 mg/dL   Basic metabolic panel    Collection Time: 08/17/23  3:55 AM   Result Value Ref Range    Sodium Level 134 (L) 136 - 145 mmol/L    Potassium Level 4.4 3.5 - 5.1 mmol/L    Chloride 106 98 - 107 mmol/L    Carbon Dioxide 19 (L) 23 - 31 mmol/L    Glucose Level 91 82 -  115 mg/dL    Blood Urea Nitrogen 10.9 9.8 - 20.1 mg/dL    Creatinine 0.76 0.55 - 1.02 mg/dL    BUN/Creatinine Ratio 14     Calcium Level Total 8.5 8.4 - 10.2 mg/dL    Anion Gap 9.0 mEq/L    eGFR >60 mls/min/1.73/m2   CBC with Differential    Collection Time: 08/17/23  3:55 AM   Result Value Ref Range    WBC 8.81 4.50 - 11.50 x10(3)/mcL    RBC 3.57 (L) 4.20 - 5.40 x10(6)/mcL    Hgb 11.9 (L) 12.0 - 16.0 g/dL    Hct 34.7 (L) 37.0 - 47.0 %    MCV 97.2 (H) 80.0 - 94.0 fL    MCH 33.3 (H) 27.0 - 31.0 pg    MCHC 34.3 33.0 - 36.0 g/dL    RDW 13.5 11.5 - 17.0 %    Platelet 257 130 - 400 x10(3)/mcL    MPV 10.2 7.4 - 10.4 fL    Neut % 60.4 %    Lymph % 27.8 %    Mono % 9.6 %    Eos % 0.8 %    Basophil % 0.3 %    Lymph # 2.45 0.6 - 4.6 x10(3)/mcL    Neut # 5.31 2.1 - 9.2 x10(3)/mcL    Mono # 0.85 0.1 - 1.3 x10(3)/mcL    Eos # 0.07 0 - 0.9 x10(3)/mcL    Baso # 0.03 <=0.2 x10(3)/mcL    IG# 0.10 (H) 0 - 0.04 x10(3)/mcL    IG% 1.1 %    NRBC% 0.0 %     Telemetry: SR    Physical Exam  Constitutional:       Appearance: Normal appearance.   HENT:      Head: Normocephalic.      Mouth/Throat:      Mouth: Mucous membranes are moist.   Eyes:      Extraocular Movements: Extraocular movements intact.   Cardiovascular:      Rate and Rhythm: Normal rate and regular rhythm.      Pulses: Normal pulses.   Pulmonary:      Effort: Pulmonary effort is normal.      Breath sounds: Normal breath sounds.   Abdominal:      Palpations: Abdomen is soft.   Musculoskeletal:         General: No swelling. Normal range of motion.   Skin:     General: Skin is warm and dry.      Comments: L Clavicular Incision with Dermabond, No Sign of Bleed/Infection. R Groin Soft/Flat, Non-Tender, No Sign of Bleed/Infection. +2 BLE Palpable Pedal Pulses    Neurological:      General: No focal deficit present.      Mental Status: She is alert and oriented to person, place, and time.   Psychiatric:         Mood and Affect: Mood normal.         Behavior: Behavior normal.        Current Inpatient Medications:    Current Facility-Administered Medications:     0.9%  NaCl infusion, , Intravenous, Continuous, Kulwant Lieberman MD, Last Rate: 100 mL/hr at 08/17/23 1429, New Bag at 08/17/23 1429    acetaminophen tablet 650 mg, 650 mg, Oral, Q4H PRN, Kulwant Lieberman MD, 650 mg at 08/16/23 1418    ALPRAZolam tablet 0.5 mg, 0.5 mg, Oral, BID PRN, Johnathon Davidson Q., NP    aspirin EC tablet 81 mg, 81 mg, Oral, On Call Procedure, Kulwant Lieberman MD, 81 mg at 08/17/23 0938    aspirin EC tablet 81 mg, 81 mg, Oral, Daily, Kulwant Lieberman MD    clopidogreL tablet 75 mg, 75 mg, Oral, On Call Procedure, Kulwant Lieberman MD, 75 mg at 08/17/23 0938    clopidogreL tablet 75 mg, 75 mg, Oral, Daily, Kulwant Lieberman MD    electrolyte-A infusion, , Intravenous, Continuous, Avtar Little MD    EScitalopram oxalate tablet 5 mg, 5 mg, Oral, Nightly, Johnathon Davidson Q., NP, 5 mg at 08/16/23 2157    hydrALAZINE injection 10 mg, 10 mg, Intravenous, Q4H PRN, Kulwant Lieberman MD    midodrine tablet 10 mg, 10 mg, Oral, TID PRN, Kulwant Lieberman MD, 10 mg at 08/17/23 1505    midodrine tablet 10 mg, 10 mg, Oral, Once, Brendan Gore ANP    midodrine tablet 10 mg, 10 mg, Oral, TID WM, Brendan Gore, EUGENIO    ondansetron disintegrating tablet 8 mg, 8 mg, Oral, Q8H PRN, Kulwant Lieberman MD, 8 mg at 08/17/23 1612    pseudoephedrine tablet 60 mg, 60 mg, Oral, TID PRN, Kulwant Lieberman MD, 60 mg at 08/17/23 1506    pseudoephedrine tablet 60 mg, 60 mg, Oral, Once, Brendan Gore, ANP    VTE Risk Mitigation (From admission, onward)      None          Assessment:   Bilateral Severe CARIDAD    - TCAR - Unsuccessful Attempt at L ICA Stenting (8.16.23)    - Bilateral CARIDAD US (4.10.23) - L ICA 80-99%; R ICA 60-79%  CAD/CABG  HTN  Chronic Combined Systolic/Diastolic HF/Ef 25% - Compensated  Hypothyroidism  ICMO/EF 25% s/p BiV CRT-D  Former Smoker  No Hx of GIB    Plan:   Keep NPO  Plan for Transfemoral Carotid Stenting Today (8.17.23)  Risk, Benefits and  Alternatives Reviewed and Discussed with the PT and their Family and they wish to proceed with above Procedure.   Continue ASA and Plavix  Labs in AM: CBC, CMP and Mg    EUGENIO Naranjo  Cardiology  [facility]  08/17/2023

## 2023-08-17 NOTE — ANESTHESIA POSTPROCEDURE EVALUATION
Anesthesia Post Evaluation    Patient: Magalis Bridges    Procedure(s) Performed: * No procedures listed *    Final Anesthesia Type: general      Patient location during evaluation: PACU  Patient participation: Yes- Able to Participate  Level of consciousness: awake and alert  Post-procedure vital signs: reviewed and stable  Pain management: adequate  Airway patency: patent      Anesthetic complications: no      Cardiovascular status: blood pressure returned to baseline  Respiratory status: unassisted  Hydration status: euvolemic  Follow-up not needed.          Vitals Value Taken Time   /52 08/16/23 1116   Temp ** 08/17/23 1302   Pulse 92 08/16/23 1119   Resp 22 08/16/23 1118   SpO2 95 % 08/16/23 1119   Vitals shown include unvalidated device data.      Event Time   Out of Recovery 11:25:00         Pain/Joe Score: Pain Rating Prior to Med Admin: 3 (8/16/2023  2:18 PM)  Pain Rating Post Med Admin: 2 (8/16/2023  3:12 PM)  Joe Score: 10 (8/16/2023 11:00 AM)

## 2023-08-18 LAB
ALBUMIN SERPL-MCNC: 2.7 G/DL (ref 3.4–4.8)
ALBUMIN/GLOB SERPL: 1.4 RATIO (ref 1.1–2)
ALP SERPL-CCNC: 42 UNIT/L (ref 40–150)
ALT SERPL-CCNC: 8 UNIT/L (ref 0–55)
AST SERPL-CCNC: 17 UNIT/L (ref 5–34)
BASOPHILS # BLD AUTO: 0.04 X10(3)/MCL
BASOPHILS NFR BLD AUTO: 0.5 %
BILIRUB SERPL-MCNC: 0.7 MG/DL
BUN SERPL-MCNC: 11.8 MG/DL (ref 9.8–20.1)
CALCIUM SERPL-MCNC: 8.5 MG/DL (ref 8.4–10.2)
CHLORIDE SERPL-SCNC: 108 MMOL/L (ref 98–107)
CO2 SERPL-SCNC: 17 MMOL/L (ref 23–31)
CREAT SERPL-MCNC: 0.72 MG/DL (ref 0.55–1.02)
EOSINOPHIL # BLD AUTO: 0.07 X10(3)/MCL (ref 0–0.9)
EOSINOPHIL NFR BLD AUTO: 0.8 %
ERYTHROCYTE [DISTWIDTH] IN BLOOD BY AUTOMATED COUNT: 13.5 % (ref 11.5–17)
GFR SERPLBLD CREATININE-BSD FMLA CKD-EPI: >60 MLS/MIN/1.73/M2
GLOBULIN SER-MCNC: 1.9 GM/DL (ref 2.4–3.5)
GLUCOSE SERPL-MCNC: 121 MG/DL (ref 70–110)
GLUCOSE SERPL-MCNC: 88 MG/DL (ref 82–115)
HCT VFR BLD AUTO: 27.9 % (ref 37–47)
HGB BLD-MCNC: 9.5 G/DL (ref 12–16)
IMM GRANULOCYTES # BLD AUTO: 0.12 X10(3)/MCL (ref 0–0.04)
IMM GRANULOCYTES NFR BLD AUTO: 1.4 %
LYMPHOCYTES # BLD AUTO: 1.97 X10(3)/MCL (ref 0.6–4.6)
LYMPHOCYTES NFR BLD AUTO: 22.9 %
MAGNESIUM SERPL-MCNC: 1.5 MG/DL (ref 1.6–2.6)
MCH RBC QN AUTO: 33.1 PG (ref 27–31)
MCHC RBC AUTO-ENTMCNC: 34.1 G/DL (ref 33–36)
MCV RBC AUTO: 97.2 FL (ref 80–94)
MONOCYTES # BLD AUTO: 0.86 X10(3)/MCL (ref 0.1–1.3)
MONOCYTES NFR BLD AUTO: 10 %
NEUTROPHILS # BLD AUTO: 5.55 X10(3)/MCL (ref 2.1–9.2)
NEUTROPHILS NFR BLD AUTO: 64.4 %
NRBC BLD AUTO-RTO: 0 %
PLATELET # BLD AUTO: 235 X10(3)/MCL (ref 130–400)
PMV BLD AUTO: 10.2 FL (ref 7.4–10.4)
POCT GLUCOSE: 106 MG/DL (ref 70–110)
POCT GLUCOSE: 133 MG/DL (ref 70–110)
POTASSIUM SERPL-SCNC: 4.1 MMOL/L (ref 3.5–5.1)
PROT SERPL-MCNC: 4.6 GM/DL (ref 5.8–7.6)
RBC # BLD AUTO: 2.87 X10(6)/MCL (ref 4.2–5.4)
SODIUM SERPL-SCNC: 132 MMOL/L (ref 136–145)
WBC # SPEC AUTO: 8.61 X10(3)/MCL (ref 4.5–11.5)

## 2023-08-18 PROCEDURE — 25000003 PHARM REV CODE 250: Performed by: NURSE PRACTITIONER

## 2023-08-18 PROCEDURE — 21400001 HC TELEMETRY ROOM

## 2023-08-18 PROCEDURE — 27000221 HC OXYGEN, UP TO 24 HOURS

## 2023-08-18 PROCEDURE — 25000003 PHARM REV CODE 250: Performed by: INTERNAL MEDICINE

## 2023-08-18 PROCEDURE — 97162 PT EVAL MOD COMPLEX 30 MIN: CPT

## 2023-08-18 PROCEDURE — 80053 COMPREHEN METABOLIC PANEL: CPT | Performed by: NURSE PRACTITIONER

## 2023-08-18 PROCEDURE — 85025 COMPLETE CBC W/AUTO DIFF WBC: CPT | Performed by: NURSE PRACTITIONER

## 2023-08-18 PROCEDURE — 83735 ASSAY OF MAGNESIUM: CPT | Performed by: NURSE PRACTITIONER

## 2023-08-18 RX ADMIN — MIDODRINE HYDROCHLORIDE 10 MG: 5 TABLET ORAL at 11:08

## 2023-08-18 RX ADMIN — CLOPIDOGREL BISULFATE 75 MG: 75 TABLET ORAL at 08:08

## 2023-08-18 RX ADMIN — ALPRAZOLAM 0.5 MG: 0.5 TABLET ORAL at 04:08

## 2023-08-18 RX ADMIN — ESCITALOPRAM OXALATE 5 MG: 5 TABLET, FILM COATED ORAL at 08:08

## 2023-08-18 RX ADMIN — MIDODRINE HYDROCHLORIDE 10 MG: 5 TABLET ORAL at 08:08

## 2023-08-18 RX ADMIN — ASPIRIN 81 MG: 81 TABLET, COATED ORAL at 08:08

## 2023-08-18 NOTE — PT/OT/SLP EVAL
Physical Therapy Evaluation    Patient Name:  Magalis Bridges   MRN:  [id number]    Recommendations:     Discharge Recommendations: home with home health   Discharge Equipment Recommendations:     Barriers to discharge:  medical dx, impaired mobility, safety     Assessment:     Magalis Bridges is a 67 y.o. female admitted with a medical diagnosis of hypotension, bilateral severe CARIDAD, CAD, and HTN. Pt underwent TCAR on 8/16/23 which was unsuccessful leading to a Transfemoral Carotid Stenting on 8/17/23. She presents with the following impairments/functional limitations: weakness, impaired endurance, gait instability, pain, impaired functional mobility.    Pt was accompanied by her niece. The pt's niece with other family members plan to take care of her during her recovery process.     Rehab Prognosis: Good; patient would benefit from acute skilled PT services to address these deficits and reach maximum level of function.    Recent Surgery: Procedure(s) (LRB):  Stent, Carotid W/ Protect (Left) 1 Day Post-Op    Plan:     During this hospitalization, patient to be seen 5 x/week to address the identified rehab impairments via gait training, therapeutic activities, therapeutic exercises and progress toward the following goals:    Plan of Care Expires:  09/18/23    Subjective     Chief Complaint: none stated   Patient/Family Comments/goals: [place]osia  Pain/Comfort:  Pain Rating 1:  (no pain rating stated, but c/o of neck px)    Patients cultural, spiritual, [employer]ism conflicts given the current situation: no    Living Environment:  Pt lives alone in a single level home, 3 step upon entry and a hand rail on the left. Family is able to help her as needed.     Prior to admission, patients level of function was Independent.  Equipment used/owned at home: bath bench, wheelchair, bedside commode, walker, rolling.    Upon discharge, patient will have assistance from family members.    Objective:     Communicated with NSG prior to  session.  Patient found HOB elevated with telemetry, pulse ox (continuous), PureWick, blood pressure cuff  upon PT entry to room.    General Precautions: Standard,    Orthopedic Precautions: (R groin)   Respiratory Status: Room air  Vitals:   Standing EOB prior to amb: 119/62, 96%    Seated in chair post amb: 100/59 98%     Exams:  Cognitive Exam:  Patient is oriented to Person, Place, and Time  BLE Strength: WFL      Functional Mobility:  Bed Mobility:     Supine to Sit: minimum assistance  Transfer:   Sit to stand: minimum assistance   Gait   Pt amb 180ft CGA with RW demonstrating step through gait pattern and decreased krystyna. Pt did not display any LOB during turning.     Treatment & Education:    Patient and provided with verbal education regarding POC and safety.  Understanding was verbalized.     Patient left up in chair with all lines intact and call button in reach.    GOALS:   Multidisciplinary Problems       Physical Therapy Goals          Problem: Physical Therapy    Goal Priority Disciplines Outcome Goal Variances Interventions   Physical Therapy Goal     PT, PT/OT Ongoing, Progressing     Description: Goals to be met by: 2023     Patient will increase functional independence with mobility by performin. Supine to sit with [place]  2. Sit to supine with [place]  3. Sit to stand transfer with Modified [place]  4. Gait  x 300 feet with Modified [place] using Rolling Walker vs No AD.   5. Ascend/descend 3 stair with left Handrails Modified [place] using No Assistive Device.                       History:     Past Medical History:   Diagnosis Date    Anxiety     Appetite loss     Arthritis     Carotid arterial disease     Coronary artery disease     DVT (deep venous thrombosis) 2021    GERD (gastroesophageal reflux disease)     HLD (hyperlipidemia)     Hypertension     Osteoporosis     Thyroid disease        Past Surgical History:   Procedure Laterality Date     CATARACT EXTRACTION Bilateral     CORONARY ARTERY BYPASS GRAFT  2021    INSERTION OF BIVENTRICULAR IMPLANTABLE CARDIOVERTER-DEFIBRILLATOR (ICD)      TONSILLECTOMY         Time Tracking:     PT Received On: 08/18/23  PT Start Time: 1448     PT Stop Time: 1510  PT Total Time (min): 22 min     Billable Minutes: Evaluation mod      08/18/2023

## 2023-08-18 NOTE — PLAN OF CARE
Problem: Physical Therapy  Goal: Physical Therapy Goal  Description: Goals to be met by: 2023     Patient will increase functional independence with mobility by performin. Supine to sit with [place]  2. Sit to supine with [place]  3. Sit to stand transfer with Modified [place]  4. Gait  x 300 feet with Modified [place] using Rolling Walker vs No AD.   5. Ascend/descend 3 stair with left Handrails Modified [place] using No Assistive Device.  Outcome: Ongoing, Progressing

## 2023-08-18 NOTE — PROGRESS NOTES
"[facility]    Cardiology  Progress Note    Patient Name: Magalis Bridges  MRN: [id number]  Admission Date: 8/16/2023  Hospital Length of Stay: 2 days  Code Status: No Order   Attending Physician: Kulwant Lieberman MD   Primary Care Physician: Olayinka Garcia MD  Expected Discharge Date:   Principal Problem:<principal problem not specified>    Subjective:     Brief HPI/Hospital Course: Ms. Bridges is a 68 y/o female who is known to [facility], Dr. Lieberman. She was recently worked up for CARIDAD and Deemed a Candidate for Carotid Stenting via TCAR Approach. On 8.16.23 she underwent a TCAR that was unsuccessful and she was stabilized and admitted to Telemetry with plans for a Transfemoral Carotid Stenting on 8.17.23. She remains NPO in preparation for this Traditional Carotid Stenting.    8.18.23: NAD. "I am doing well." Denies CP, SOB and Palps. SBP Improved post Transfemoral Carotid Stenting of L ICA.     PMH: CAD/CABG, CARIDAD/Bilaterally Mod-Severe, Chronic Combined Systolic/Diastolic HF/EF 25, PAD, HTN, HLD, DVT, Hypothyroidism, Tobacco Use  PSH: Angiograms, CABG   Family History: Father, D, MI; Mother, D, HTN  Social History: Denies Illicit Drug, ETOH and Tobacco Use; Former Smoker, Quit Years Prior     Previous Diagnostics:  Carotid 8.17.23:  Successful transfemoral carotid artery stenting for left internal carotid artery performed with distal protection device  The procedure log was documented by No documenter listed and verified by Kulwant Lieberman MD.  Procedures performed:   High risk carotid artery stenosis of greater than 80%   Procedure performed:   Patient brought to the cardiac cath lab.  She could not get a TCAR due to severely calcific carotid stenosis and was not a candidate for carotid endarterectomy    Carotid US 4.10.23:  The study quality is average.   60-79% stenosis in the mid right internal carotid artery based on Bluth Criteria.   80-99% stenosis in the proximal left internal carotid artery based on Bluth " Criteria.   Antegrade right vertebral artery flow.   Antegrade left vertebral artery flow.     ECHO 10.26.22:  The study quality is good.   Global left ventricular systolic function is severely decreased.  The left ventricular ejection fraction is 25%. Left ventricular diastolic function is indeterminate. Noted left ventricular hypertrophy. Concentric left ventricular hypertrophy is present. It is mild.  LVEF was obtained via 2D measurement, Fung's Rule, and LV Strain.   The left atrial diameter is mildly increased.   Mild (1+) mitral regurgitation. Mild (1+) tricuspid regurgitation.  Normal pulmonary artery pressure.    Review of Systems   Constitutional: Positive for malaise/fatigue.   Cardiovascular:  Negative for chest pain, irregular heartbeat, leg swelling and orthopnea.   Respiratory:  Negative for shortness of breath.    All other systems reviewed and are negative.    Objective:     Vital Signs (Most Recent):  Temp: 97.7 °F (36.5 °C) (08/18/23 0815)  Pulse: 83 (08/18/23 0815)  Resp: 18 (08/18/23 0815)  BP: (!) 90/48 (08/18/23 0815)  SpO2: 95 % (08/18/23 0815) Vital Signs (24h Range):  Temp:  [97.7 °F (36.5 °C)-98.5 °F (36.9 °C)] 97.7 °F (36.5 °C)  Pulse:  [] 83  Resp:  [18] 18  SpO2:  [95 %-99 %] 95 %  BP: ()/(31-68) 90/48     Weight: 59.2 kg (130 lb 8.2 oz)  Body mass index is 21.39 kg/m².    SpO2: 95 %         Intake/Output Summary (Last 24 hours) at 8/18/2023 0930  Last data filed at 8/18/2023 0600  Gross per 24 hour   Intake 0 ml   Output 650 ml   Net -650 ml       Lines/Drains/Airways       Line  Duration                  Sheath 08/17/23 1235 Right anterior;proximal <1 day              Peripheral Intravenous Line  Duration                  Peripheral IV - Single Lumen 08/16/23 0630 20 G Anterior;Distal;Right Forearm 2 days         Peripheral IV - Single Lumen 08/16/23 0805 20 G Left Hand 2 days                  Significant Labs:   Recent Results (from the past 72 hour(s))   ISTAT ACT-K     Collection Time: 08/16/23  9:02 AM   Result Value Ref Range    POC ACTIVATED CLOTTING TIME K 101 74 - 137 sec    Sample unknown    POCT glucose    Collection Time: 08/16/23  9:19 AM   Result Value Ref Range    POCT Glucose 96 70 - 110 mg/dL   POCT glucose    Collection Time: 08/16/23  4:06 PM   Result Value Ref Range    POCT Glucose 96 70 - 110 mg/dL   Basic metabolic panel    Collection Time: 08/17/23  3:55 AM   Result Value Ref Range    Sodium Level 134 (L) 136 - 145 mmol/L    Potassium Level 4.4 3.5 - 5.1 mmol/L    Chloride 106 98 - 107 mmol/L    Carbon Dioxide 19 (L) 23 - 31 mmol/L    Glucose Level 91 82 - 115 mg/dL    Blood Urea Nitrogen 10.9 9.8 - 20.1 mg/dL    Creatinine 0.76 0.55 - 1.02 mg/dL    BUN/Creatinine Ratio 14     Calcium Level Total 8.5 8.4 - 10.2 mg/dL    Anion Gap 9.0 mEq/L    eGFR >60 mls/min/1.73/m2   CBC with Differential    Collection Time: 08/17/23  3:55 AM   Result Value Ref Range    WBC 8.81 4.50 - 11.50 x10(3)/mcL    RBC 3.57 (L) 4.20 - 5.40 x10(6)/mcL    Hgb 11.9 (L) 12.0 - 16.0 g/dL    Hct 34.7 (L) 37.0 - 47.0 %    MCV 97.2 (H) 80.0 - 94.0 fL    MCH 33.3 (H) 27.0 - 31.0 pg    MCHC 34.3 33.0 - 36.0 g/dL    RDW 13.5 11.5 - 17.0 %    Platelet 257 130 - 400 x10(3)/mcL    MPV 10.2 7.4 - 10.4 fL    Neut % 60.4 %    Lymph % 27.8 %    Mono % 9.6 %    Eos % 0.8 %    Basophil % 0.3 %    Lymph # 2.45 0.6 - 4.6 x10(3)/mcL    Neut # 5.31 2.1 - 9.2 x10(3)/mcL    Mono # 0.85 0.1 - 1.3 x10(3)/mcL    Eos # 0.07 0 - 0.9 x10(3)/mcL    Baso # 0.03 <=0.2 x10(3)/mcL    IG# 0.10 (H) 0 - 0.04 x10(3)/mcL    IG% 1.1 %    NRBC% 0.0 %   POCT glucose    Collection Time: 08/17/23  3:45 PM   Result Value Ref Range    POCT Glucose 96 70 - 110 mg/dL   POCT glucose    Collection Time: 08/17/23 10:15 PM   Result Value Ref Range    POC Glucose 131 (A) 70 - 110 MG/DL   Comprehensive Metabolic Panel    Collection Time: 08/18/23  3:56 AM   Result Value Ref Range    Sodium Level 132 (L) 136 - 145 mmol/L    Potassium Level 4.1  3.5 - 5.1 mmol/L    Chloride 108 (H) 98 - 107 mmol/L    Carbon Dioxide 17 (L) 23 - 31 mmol/L    Glucose Level 88 82 - 115 mg/dL    Blood Urea Nitrogen 11.8 9.8 - 20.1 mg/dL    Creatinine 0.72 0.55 - 1.02 mg/dL    Calcium Level Total 8.5 8.4 - 10.2 mg/dL    Protein Total 4.6 (L) 5.8 - 7.6 gm/dL    Albumin Level 2.7 (L) 3.4 - 4.8 g/dL    Globulin 1.9 (L) 2.4 - 3.5 gm/dL    Albumin/Globulin Ratio 1.4 1.1 - 2.0 ratio    Bilirubin Total 0.7 <=1.5 mg/dL    Alkaline Phosphatase 42 40 - 150 unit/L    Alanine Aminotransferase 8 0 - 55 unit/L    Aspartate Aminotransferase 17 5 - 34 unit/L    eGFR >60 mls/min/1.73/m2   Magnesium    Collection Time: 08/18/23  3:56 AM   Result Value Ref Range    Magnesium Level 1.50 (L) 1.60 - 2.60 mg/dL   CBC with Differential    Collection Time: 08/18/23  3:56 AM   Result Value Ref Range    WBC 8.61 4.50 - 11.50 x10(3)/mcL    RBC 2.87 (L) 4.20 - 5.40 x10(6)/mcL    Hgb 9.5 (L) 12.0 - 16.0 g/dL    Hct 27.9 (L) 37.0 - 47.0 %    MCV 97.2 (H) 80.0 - 94.0 fL    MCH 33.1 (H) 27.0 - 31.0 pg    MCHC 34.1 33.0 - 36.0 g/dL    RDW 13.5 11.5 - 17.0 %    Platelet 235 130 - 400 x10(3)/mcL    MPV 10.2 7.4 - 10.4 fL    Neut % 64.4 %    Lymph % 22.9 %    Mono % 10.0 %    Eos % 0.8 %    Basophil % 0.5 %    Lymph # 1.97 0.6 - 4.6 x10(3)/mcL    Neut # 5.55 2.1 - 9.2 x10(3)/mcL    Mono # 0.86 0.1 - 1.3 x10(3)/mcL    Eos # 0.07 0 - 0.9 x10(3)/mcL    Baso # 0.04 <=0.2 x10(3)/mcL    IG# 0.12 (H) 0 - 0.04 x10(3)/mcL    IG% 1.4 %    NRBC% 0.0 %   POCT glucose    Collection Time: 08/18/23  6:20 AM   Result Value Ref Range    POC Glucose 121 (A) 70 - 110 MG/DL     Telemetry: SR    Physical Exam  Constitutional:       General: She is not in acute distress.     Appearance: Normal appearance.   HENT:      Head: Normocephalic.      Mouth/Throat:      Mouth: Mucous membranes are moist.   Eyes:      Extraocular Movements: Extraocular movements intact.   Cardiovascular:      Rate and Rhythm: Normal rate and regular rhythm.       Pulses: Normal pulses.   Pulmonary:      Effort: Pulmonary effort is normal. No respiratory distress.      Breath sounds: Normal breath sounds.   Abdominal:      Palpations: Abdomen is soft.   Musculoskeletal:         General: No swelling. Normal range of motion.   Skin:     General: Skin is warm and dry.      Comments: L Clavicular Incision with Dermabond, No Sign of Bleed/Infection. R Groin Soft/Flat, Non-Tender, + Ecchymosis, No Sign of Bleed/Infection. +2 BLE Palpable Pedal Pulses    Neurological:      General: No focal deficit present.      Mental Status: She is alert and oriented to person, place, and time.   Psychiatric:         Mood and Affect: Mood normal.         Behavior: Behavior normal.       Current Inpatient Medications:    Current Facility-Administered Medications:     0.9%  NaCl infusion, , Intravenous, Continuous, Kulwant Lieberman MD, Last Rate: 100 mL/hr at 08/17/23 1429, New Bag at 08/17/23 1429    acetaminophen tablet 650 mg, 650 mg, Oral, Q4H PRN, Kulwant Lieberman MD, 650 mg at 08/16/23 1418    ALPRAZolam tablet 0.5 mg, 0.5 mg, Oral, BID PRN, Eugene Davidsonall Q., NP, 0.5 mg at 08/17/23 1749    aspirin EC tablet 81 mg, 81 mg, Oral, On Call Procedure, Kulwant Lieberman MD, 81 mg at 08/17/23 0938    aspirin EC tablet 81 mg, 81 mg, Oral, Daily, Kulwant Lieberman MD, 81 mg at 08/18/23 0816    clopidogreL tablet 75 mg, 75 mg, Oral, On Call Procedure, Kulwant Lieberman MD, 75 mg at 08/17/23 0938    clopidogreL tablet 75 mg, 75 mg, Oral, Daily, Kulwant Lieberman MD, 75 mg at 08/18/23 0816    electrolyte-A infusion, , Intravenous, Continuous, Avtar Little MD    EScitalopram oxalate tablet 5 mg, 5 mg, Oral, Nightly, Lety Johnathon Q., NP, 5 mg at 08/17/23 2157    hydrALAZINE injection 10 mg, 10 mg, Intravenous, Q4H PRN, Kulwant Lieberman MD    midodrine tablet 10 mg, 10 mg, Oral, TID PRN, Kulwant Lieberman MD, 10 mg at 08/17/23 2157    midodrine tablet 10 mg, 10 mg, Oral, Once, Brendan Gore, ANP    midodrine tablet 10 mg, 10  mg, Oral, TID WM, Brendan Gore ANP, 10 mg at 08/18/23 0815    ondansetron disintegrating tablet 8 mg, 8 mg, Oral, Q8H PRN, Kulwant Lieberman MD, 8 mg at 08/17/23 1612    pseudoephedrine tablet 60 mg, 60 mg, Oral, TID PRN, Kulwant Lieberman MD, 60 mg at 08/17/23 1506    pseudoephedrine tablet 60 mg, 60 mg, Oral, Once, Brendan Gore ANP    VTE Risk Mitigation (From admission, onward)      None          Assessment:   Hypotension post Carotid Stenting - Resolving with Oral Midodrine/Sudafed  Bilateral Severe CARIDAD    - s/p Transfemoral (8.17.23) - L ICA Stenting (8.17.23)    - TCAR - Unsuccessful Attempt at L ICA Stenting (8.16.23)    - Bilateral CARIDAD US (4.10.23) - L ICA 80-99%; R ICA 60-79%  CAD/CABG  HTN  Chronic Combined Systolic/Diastolic HF/Ef 25% - Compensated  Hypothyroidism  ICMO/EF 25% s/p BiV CRT-D  Former Smoker  No Hx of GIB    Plan:   Groin Precautions - R Groin US to Eval for Pseudo (Preliminary) Negative - Final Read Pending  Continue Midodrine for BP Support  Continue ASA and Plavix  Consult PT/OT Eval and Treat  Possible D/C in AM  Labs in AM: CBC, CMP and Mg    EUGENIO Naranjo  Cardiology  [facility]  08/18/2023     I have seen the patient, reviewed the Nurse Practitioner's note, assessment and plan. I have personally interviewed and examined the patient at bedside and agree with the findings. Medical decision making listed above were done under my guidance.

## 2023-08-19 VITALS
BODY MASS INDEX: 20.97 KG/M2 | TEMPERATURE: 99 F | DIASTOLIC BLOOD PRESSURE: 60 MMHG | SYSTOLIC BLOOD PRESSURE: 153 MMHG | HEIGHT: 66 IN | WEIGHT: 130.5 LBS | HEART RATE: 72 BPM | OXYGEN SATURATION: 94 % | RESPIRATION RATE: 18 BRPM

## 2023-08-19 PROBLEM — I65.22 OCCLUSION OF LEFT CAROTID ARTERY: Status: ACTIVE | Noted: 2023-08-19

## 2023-08-19 LAB
ALBUMIN SERPL-MCNC: 3 G/DL (ref 3.4–4.8)
ALBUMIN/GLOB SERPL: 1.4 RATIO (ref 1.1–2)
ALP SERPL-CCNC: 49 UNIT/L (ref 40–150)
ALT SERPL-CCNC: 9 UNIT/L (ref 0–55)
AST SERPL-CCNC: 19 UNIT/L (ref 5–34)
BASOPHILS # BLD AUTO: 0.04 X10(3)/MCL
BASOPHILS NFR BLD AUTO: 0.5 %
BILIRUB SERPL-MCNC: 0.5 MG/DL
BUN SERPL-MCNC: 9.1 MG/DL (ref 9.8–20.1)
CALCIUM SERPL-MCNC: 8.5 MG/DL (ref 8.4–10.2)
CHLORIDE SERPL-SCNC: 108 MMOL/L (ref 98–107)
CO2 SERPL-SCNC: 19 MMOL/L (ref 23–31)
CREAT SERPL-MCNC: 0.75 MG/DL (ref 0.55–1.02)
EOSINOPHIL # BLD AUTO: 0.14 X10(3)/MCL (ref 0–0.9)
EOSINOPHIL NFR BLD AUTO: 1.7 %
ERYTHROCYTE [DISTWIDTH] IN BLOOD BY AUTOMATED COUNT: 13.4 % (ref 11.5–17)
GFR SERPLBLD CREATININE-BSD FMLA CKD-EPI: >60 MLS/MIN/1.73/M2
GLOBULIN SER-MCNC: 2.2 GM/DL (ref 2.4–3.5)
GLUCOSE SERPL-MCNC: 82 MG/DL (ref 82–115)
HCT VFR BLD AUTO: 27.9 % (ref 37–47)
HGB BLD-MCNC: 9.5 G/DL (ref 12–16)
IMM GRANULOCYTES # BLD AUTO: 0.11 X10(3)/MCL (ref 0–0.04)
IMM GRANULOCYTES NFR BLD AUTO: 1.3 %
LYMPHOCYTES # BLD AUTO: 2.68 X10(3)/MCL (ref 0.6–4.6)
LYMPHOCYTES NFR BLD AUTO: 32.8 %
MAGNESIUM SERPL-MCNC: 1.5 MG/DL (ref 1.6–2.6)
MCH RBC QN AUTO: 33.6 PG (ref 27–31)
MCHC RBC AUTO-ENTMCNC: 34.1 G/DL (ref 33–36)
MCV RBC AUTO: 98.6 FL (ref 80–94)
MONOCYTES # BLD AUTO: 0.72 X10(3)/MCL (ref 0.1–1.3)
MONOCYTES NFR BLD AUTO: 8.8 %
NEUTROPHILS # BLD AUTO: 4.48 X10(3)/MCL (ref 2.1–9.2)
NEUTROPHILS NFR BLD AUTO: 54.9 %
NRBC BLD AUTO-RTO: 0 %
PLATELET # BLD AUTO: 230 X10(3)/MCL (ref 130–400)
PMV BLD AUTO: 10.5 FL (ref 7.4–10.4)
POCT GLUCOSE: 92 MG/DL (ref 70–110)
POTASSIUM SERPL-SCNC: 4.5 MMOL/L (ref 3.5–5.1)
PROT SERPL-MCNC: 5.2 GM/DL (ref 5.8–7.6)
RBC # BLD AUTO: 2.83 X10(6)/MCL (ref 4.2–5.4)
SODIUM SERPL-SCNC: 133 MMOL/L (ref 136–145)
WBC # SPEC AUTO: 8.17 X10(3)/MCL (ref 4.5–11.5)

## 2023-08-19 PROCEDURE — 80053 COMPREHEN METABOLIC PANEL: CPT | Performed by: NURSE PRACTITIONER

## 2023-08-19 PROCEDURE — 25000003 PHARM REV CODE 250: Performed by: INTERNAL MEDICINE

## 2023-08-19 PROCEDURE — 83735 ASSAY OF MAGNESIUM: CPT | Performed by: NURSE PRACTITIONER

## 2023-08-19 PROCEDURE — 85025 COMPLETE CBC W/AUTO DIFF WBC: CPT | Performed by: NURSE PRACTITIONER

## 2023-08-19 RX ADMIN — CLOPIDOGREL BISULFATE 75 MG: 75 TABLET ORAL at 10:08

## 2023-08-19 RX ADMIN — ASPIRIN 81 MG: 81 TABLET, COATED ORAL at 10:08

## 2023-08-19 NOTE — DISCHARGE SUMMARY
"[facility]    Cardiology  Discharge Summary    Patient Name: Magalis Bridges  MRN: [id number]  Admission Date: 8/16/2023  Hospital Length of Stay: 3 days  Code Status: No Order   Attending Physician: Kulwant Lieberman MD   Primary Care Physician: Olayinka Garcia MD  Expected Discharge Date: 8/19/2023  Principal Problem:Occlusion of left carotid artery    Subjective:     Brief HPI/Hospital Course: Ms. Bridges is a 68 y/o female who is known to [facility], Dr. Lieberman. She was recently worked up for CARIDAD and Deemed a Candidate for Carotid Stenting via TCAR Approach. On 8.16.23 she underwent a TCAR that was unsuccessful and she was stabilized and admitted to Telemetry with plans for a Transfemoral Carotid Stenting on 8.17.23. She remains NPO in preparation for this Traditional Carotid Stenting.    8.18.23: NAD. "I am doing well." Denies CP, SOB and Palps. SBP Improved post Transfemoral Carotid Stenting of L ICA.   8.19.23: NAD noted. VSS with improved BP, not requiring midodrine or pseudoephedrine. Denies CP/SOB/Palps    PMH: CAD/CABG, CARIDAD/Bilaterally Mod-Severe, Chronic Combined Systolic/Diastolic HF/EF 25, PAD, HTN, HLD, DVT, Hypothyroidism, Tobacco Use  PSH: Angiograms, CABG   Family History: Father, D, MI; Mother, D, HTN  Social History: Denies Illicit Drug, ETOH and Tobacco Use; Former Smoker, Quit Years Prior     Previous Diagnostics:  Carotid 8.17.23:  Successful transfemoral carotid artery stenting for left internal carotid artery performed with distal protection device  The procedure log was documented by No documenter listed and verified by Kulwant Lieberman MD.  Procedures performed:   High risk carotid artery stenosis of greater than 80%   Procedure performed:   Patient brought to the cardiac cath lab.  She could not get a TCAR due to severely calcific carotid stenosis and was not a candidate for carotid endarterectomy    Carotid US 4.10.23:  The study quality is average.   60-79% stenosis in the mid right internal " carotid artery based on Bluth Criteria.   80-99% stenosis in the proximal left internal carotid artery based on Bluth Criteria.   Antegrade right vertebral artery flow.   Antegrade left vertebral artery flow.     ECHO 10.26.22:  The study quality is good.   Global left ventricular systolic function is severely decreased.  The left ventricular ejection fraction is 25%. Left ventricular diastolic function is indeterminate. Noted left ventricular hypertrophy. Concentric left ventricular hypertrophy is present. It is mild.  LVEF was obtained via 2D measurement, Fung's Rule, and LV Strain.   The left atrial diameter is mildly increased.   Mild (1+) mitral regurgitation. Mild (1+) tricuspid regurgitation.  Normal pulmonary artery pressure.    Review of Systems   Constitutional: Positive for malaise/fatigue.   Cardiovascular:  Negative for chest pain, irregular heartbeat, leg swelling and orthopnea.   Respiratory:  Negative for shortness of breath.    All other systems reviewed and are negative.    Objective:     Vital Signs (Most Recent):  Temp: 98.7 °F (37.1 °C) (08/19/23 0803)  Pulse: 72 (08/19/23 0803)  Resp: 18 (08/18/23 1534)  BP: (!) 153/60 (08/19/23 0803)  SpO2: (!) 94 % (08/19/23 0803) Vital Signs (24h Range):  Temp:  [97.7 °F (36.5 °C)-98.7 °F (37.1 °C)] 98.7 °F (37.1 °C)  Pulse:  [65-78] 72  Resp:  [18] 18  SpO2:  [93 %-97 %] 94 %  BP: (106-153)/(54-67) 153/60     Weight: 59.2 kg (130 lb 8.2 oz)  Body mass index is 21.39 kg/m².    SpO2: (!) 94 %         Intake/Output Summary (Last 24 hours) at 8/19/2023 1132  Last data filed at 8/18/2023 1433  Gross per 24 hour   Intake 360 ml   Output 0 ml   Net 360 ml       Lines/Drains/Airways       Line  Duration                  Sheath 08/17/23 1235 Right anterior;proximal 1 day              Peripheral Intravenous Line  Duration                  Peripheral IV - Single Lumen 08/16/23 0630 20 G Anterior;Distal;Right Forearm 3 days         Peripheral IV - Single Lumen  08/16/23 0805 20 G Left Hand 3 days                  Significant Labs:   Recent Results (from the past 72 hour(s))   POCT glucose    Collection Time: 08/16/23  4:06 PM   Result Value Ref Range    POCT Glucose 96 70 - 110 mg/dL   Basic metabolic panel    Collection Time: 08/17/23  3:55 AM   Result Value Ref Range    Sodium Level 134 (L) 136 - 145 mmol/L    Potassium Level 4.4 3.5 - 5.1 mmol/L    Chloride 106 98 - 107 mmol/L    Carbon Dioxide 19 (L) 23 - 31 mmol/L    Glucose Level 91 82 - 115 mg/dL    Blood Urea Nitrogen 10.9 9.8 - 20.1 mg/dL    Creatinine 0.76 0.55 - 1.02 mg/dL    BUN/Creatinine Ratio 14     Calcium Level Total 8.5 8.4 - 10.2 mg/dL    Anion Gap 9.0 mEq/L    eGFR >60 mls/min/1.73/m2   CBC with Differential    Collection Time: 08/17/23  3:55 AM   Result Value Ref Range    WBC 8.81 4.50 - 11.50 x10(3)/mcL    RBC 3.57 (L) 4.20 - 5.40 x10(6)/mcL    Hgb 11.9 (L) 12.0 - 16.0 g/dL    Hct 34.7 (L) 37.0 - 47.0 %    MCV 97.2 (H) 80.0 - 94.0 fL    MCH 33.3 (H) 27.0 - 31.0 pg    MCHC 34.3 33.0 - 36.0 g/dL    RDW 13.5 11.5 - 17.0 %    Platelet 257 130 - 400 x10(3)/mcL    MPV 10.2 7.4 - 10.4 fL    Neut % 60.4 %    Lymph % 27.8 %    Mono % 9.6 %    Eos % 0.8 %    Basophil % 0.3 %    Lymph # 2.45 0.6 - 4.6 x10(3)/mcL    Neut # 5.31 2.1 - 9.2 x10(3)/mcL    Mono # 0.85 0.1 - 1.3 x10(3)/mcL    Eos # 0.07 0 - 0.9 x10(3)/mcL    Baso # 0.03 <=0.2 x10(3)/mcL    IG# 0.10 (H) 0 - 0.04 x10(3)/mcL    IG% 1.1 %    NRBC% 0.0 %   POCT glucose    Collection Time: 08/17/23  3:45 PM   Result Value Ref Range    POCT Glucose 96 70 - 110 mg/dL   POCT glucose    Collection Time: 08/17/23 10:15 PM   Result Value Ref Range    POC Glucose 131 (A) 70 - 110 MG/DL   Comprehensive Metabolic Panel    Collection Time: 08/18/23  3:56 AM   Result Value Ref Range    Sodium Level 132 (L) 136 - 145 mmol/L    Potassium Level 4.1 3.5 - 5.1 mmol/L    Chloride 108 (H) 98 - 107 mmol/L    Carbon Dioxide 17 (L) 23 - 31 mmol/L    Glucose Level 88 82 - 115  mg/dL    Blood Urea Nitrogen 11.8 9.8 - 20.1 mg/dL    Creatinine 0.72 0.55 - 1.02 mg/dL    Calcium Level Total 8.5 8.4 - 10.2 mg/dL    Protein Total 4.6 (L) 5.8 - 7.6 gm/dL    Albumin Level 2.7 (L) 3.4 - 4.8 g/dL    Globulin 1.9 (L) 2.4 - 3.5 gm/dL    Albumin/Globulin Ratio 1.4 1.1 - 2.0 ratio    Bilirubin Total 0.7 <=1.5 mg/dL    Alkaline Phosphatase 42 40 - 150 unit/L    Alanine Aminotransferase 8 0 - 55 unit/L    Aspartate Aminotransferase 17 5 - 34 unit/L    eGFR >60 mls/min/1.73/m2   Magnesium    Collection Time: 08/18/23  3:56 AM   Result Value Ref Range    Magnesium Level 1.50 (L) 1.60 - 2.60 mg/dL   CBC with Differential    Collection Time: 08/18/23  3:56 AM   Result Value Ref Range    WBC 8.61 4.50 - 11.50 x10(3)/mcL    RBC 2.87 (L) 4.20 - 5.40 x10(6)/mcL    Hgb 9.5 (L) 12.0 - 16.0 g/dL    Hct 27.9 (L) 37.0 - 47.0 %    MCV 97.2 (H) 80.0 - 94.0 fL    MCH 33.1 (H) 27.0 - 31.0 pg    MCHC 34.1 33.0 - 36.0 g/dL    RDW 13.5 11.5 - 17.0 %    Platelet 235 130 - 400 x10(3)/mcL    MPV 10.2 7.4 - 10.4 fL    Neut % 64.4 %    Lymph % 22.9 %    Mono % 10.0 %    Eos % 0.8 %    Basophil % 0.5 %    Lymph # 1.97 0.6 - 4.6 x10(3)/mcL    Neut # 5.55 2.1 - 9.2 x10(3)/mcL    Mono # 0.86 0.1 - 1.3 x10(3)/mcL    Eos # 0.07 0 - 0.9 x10(3)/mcL    Baso # 0.04 <=0.2 x10(3)/mcL    IG# 0.12 (H) 0 - 0.04 x10(3)/mcL    IG% 1.4 %    NRBC% 0.0 %   POCT glucose    Collection Time: 08/18/23  6:20 AM   Result Value Ref Range    POC Glucose 121 (A) 70 - 110 MG/DL   POCT glucose    Collection Time: 08/18/23 11:13 AM   Result Value Ref Range    POCT Glucose 106 70 - 110 mg/dL   POCT glucose    Collection Time: 08/18/23  4:12 PM   Result Value Ref Range    POCT Glucose 133 (H) 70 - 110 mg/dL   Comprehensive Metabolic Panel    Collection Time: 08/19/23  4:27 AM   Result Value Ref Range    Sodium Level 133 (L) 136 - 145 mmol/L    Potassium Level 4.5 3.5 - 5.1 mmol/L    Chloride 108 (H) 98 - 107 mmol/L    Carbon Dioxide 19 (L) 23 - 31 mmol/L     Glucose Level 82 82 - 115 mg/dL    Blood Urea Nitrogen 9.1 (L) 9.8 - 20.1 mg/dL    Creatinine 0.75 0.55 - 1.02 mg/dL    Calcium Level Total 8.5 8.4 - 10.2 mg/dL    Protein Total 5.2 (L) 5.8 - 7.6 gm/dL    Albumin Level 3.0 (L) 3.4 - 4.8 g/dL    Globulin 2.2 (L) 2.4 - 3.5 gm/dL    Albumin/Globulin Ratio 1.4 1.1 - 2.0 ratio    Bilirubin Total 0.5 <=1.5 mg/dL    Alkaline Phosphatase 49 40 - 150 unit/L    Alanine Aminotransferase 9 0 - 55 unit/L    Aspartate Aminotransferase 19 5 - 34 unit/L    eGFR >60 mls/min/1.73/m2   Magnesium    Collection Time: 08/19/23  4:27 AM   Result Value Ref Range    Magnesium Level 1.50 (L) 1.60 - 2.60 mg/dL   CBC with Differential    Collection Time: 08/19/23  4:27 AM   Result Value Ref Range    WBC 8.17 4.50 - 11.50 x10(3)/mcL    RBC 2.83 (L) 4.20 - 5.40 x10(6)/mcL    Hgb 9.5 (L) 12.0 - 16.0 g/dL    Hct 27.9 (L) 37.0 - 47.0 %    MCV 98.6 (H) 80.0 - 94.0 fL    MCH 33.6 (H) 27.0 - 31.0 pg    MCHC 34.1 33.0 - 36.0 g/dL    RDW 13.4 11.5 - 17.0 %    Platelet 230 130 - 400 x10(3)/mcL    MPV 10.5 (H) 7.4 - 10.4 fL    Neut % 54.9 %    Lymph % 32.8 %    Mono % 8.8 %    Eos % 1.7 %    Basophil % 0.5 %    Lymph # 2.68 0.6 - 4.6 x10(3)/mcL    Neut # 4.48 2.1 - 9.2 x10(3)/mcL    Mono # 0.72 0.1 - 1.3 x10(3)/mcL    Eos # 0.14 0 - 0.9 x10(3)/mcL    Baso # 0.04 <=0.2 x10(3)/mcL    IG# 0.11 (H) 0 - 0.04 x10(3)/mcL    IG% 1.3 %    NRBC% 0.0 %     Telemetry: SR    Physical Exam  Constitutional:       General: She is not in acute distress.     Appearance: Normal appearance.   HENT:      Head: Normocephalic.      Mouth/Throat:      Mouth: Mucous membranes are moist.   Eyes:      Extraocular Movements: Extraocular movements intact.   Cardiovascular:      Rate and Rhythm: Normal rate and regular rhythm.      Pulses: Normal pulses.   Pulmonary:      Effort: Pulmonary effort is normal. No respiratory distress.      Breath sounds: Normal breath sounds.   Abdominal:      Palpations: Abdomen is soft.    Musculoskeletal:         General: No swelling. Normal range of motion.   Skin:     General: Skin is warm and dry.      Comments: L Clavicular Incision with Dermabond, No Sign of Bleed/Infection. R Groin Soft/Flat, Non-Tender, + Ecchymosis, No Sign of Bleed/Infection. +2 BLE Palpable Pedal Pulses    Neurological:      General: No focal deficit present.      Mental Status: She is alert and oriented to person, place, and time.   Psychiatric:         Mood and Affect: Mood normal.         Behavior: Behavior normal.       Current Inpatient Medications:    Current Facility-Administered Medications:     0.9%  NaCl infusion, , Intravenous, Continuous, Kulwant Lieberman MD, Last Rate: 100 mL/hr at 08/17/23 1429, New Bag at 08/17/23 1429    acetaminophen tablet 650 mg, 650 mg, Oral, Q4H PRN, Kulwant Lieberman MD, 650 mg at 08/16/23 1418    ALPRAZolam tablet 0.5 mg, 0.5 mg, Oral, BID PRN, Johnathon Davidson Q., NP, 0.5 mg at 08/18/23 1616    aspirin EC tablet 81 mg, 81 mg, Oral, On Call Procedure, Kulwant Lieberman MD, 81 mg at 08/17/23 0938    aspirin EC tablet 81 mg, 81 mg, Oral, Daily, Kulwant Lieberman MD, 81 mg at 08/19/23 1036    clopidogreL tablet 75 mg, 75 mg, Oral, On Call Procedure, Kulwant Lieberman MD, 75 mg at 08/17/23 0938    clopidogreL tablet 75 mg, 75 mg, Oral, Daily, Kulwant Lieberman MD, 75 mg at 08/19/23 1036    electrolyte-A infusion, , Intravenous, Continuous, Avtar Little MD    EScitalopram oxalate tablet 5 mg, 5 mg, Oral, Nightly, Lety Johnathon Q., NP, 5 mg at 08/18/23 2011    hydrALAZINE injection 10 mg, 10 mg, Intravenous, Q4H PRN, Kulwant Lieberman MD    midodrine tablet 10 mg, 10 mg, Oral, TID PRN, Kulwant Lieberman MD, 10 mg at 08/18/23 1109    midodrine tablet 10 mg, 10 mg, Oral, Once, Brendan Gore, ANP    ondansetron disintegrating tablet 8 mg, 8 mg, Oral, Q8H PRN, Kulwant Leiberman MD, 8 mg at 08/17/23 1612    pseudoephedrine tablet 60 mg, 60 mg, Oral, TID PRN, Kulwant Lieberman MD, 60 mg at 08/17/23 1506    pseudoephedrine  tablet 60 mg, 60 mg, Oral, Once, Brendan Gore, EUGENIO    VTE Risk Mitigation (From admission, onward)      None          Assessment:   Hypotension post Carotid Stenting - Resolving with Oral Midodrine/Sudafed  Bilateral Severe CARIDAD    - s/p Transfemoral (8.17.23) - L ICA Stenting (8.17.23)    - TCAR - Unsuccessful Attempt at L ICA Stenting (8.16.23)    - Bilateral CARIDAD US (4.10.23) - L ICA 80-99%; R ICA 60-79%  CAD/CABG  HTN  Chronic Combined Systolic/Diastolic HF/Ef 25% - Compensated  Hypothyroidism  ICMO/EF 25% s/p BiV CRT-D  Former Smoker  No Hx of GIB    Plan:   DC home  Hold losartan for now, can be resumed as outpatient  NO midodrine or pseudoephedrine as BP has normalized  Continue ASA and Plavix  Keep scheduled F/U appt    DISCHARGE PLAN:  Discharge: Home  Discharge Diet: Cardiac, Low Sodium  Discharge Condition: Stable  Discharge Activity:  As Tolerated, No Heavy Lifting > 5 lbs., Notify MD with Symptoms of Bleed/Infection  Discharge Time: 36 minutes    Discharge Medications:     Medication List        CONTINUE taking these medications      alendronate 70 MG tablet  Commonly known as: FOSAMAX     ALPRAZolam 0.5 MG tablet  Commonly known as: XANAX     aspirin 81 MG EC tablet  Commonly known as: ECOTRIN     atorvastatin 40 MG tablet  Commonly known as: LIPITOR     clopidogreL 75 mg tablet  Commonly known as: PLAVIX     EScitalopram oxalate 5 MG Tab  Commonly known as: LEXAPRO     levothyroxine 100 MCG tablet  Commonly known as: SYNTHROID     megestroL 400 mg/10 mL (40 mg/mL) Susp  Commonly known as: MEGACE     metoprolol succinate 25 mg Cspx     pantoprazole 20 MG tablet  Commonly known as: PROTONIX     XARELTO 10 mg Tab  Generic drug: rivaroxaban            STOP taking these medications      losartan 100 MG tablet  Commonly known as: KATRINA Richter-BC  Cardiology  [facility]  08/19/2023     I have reviewed the Nurse Practitioner's note, assessment and discharge  plan. Medical decision making listed above were done under my guidance.

## 2023-08-19 NOTE — PROGRESS NOTES
Discharge instructions given to patient and patient's daughter. Patient's IV and heart monitor removed. Patient's medications and follow up appointments reviewed. Patient educated on procedure, signs and symptoms of infection. Patient voiced understanding. Patient discharged home with family, awaiting transport.

## 2023-08-19 NOTE — PLAN OF CARE
Nursing updates me that pt is discharging and wants [facility] again. There is no order but I did contact answering service for [facility] and left info regarding referral and also faxed request and hospital dc info to the [place] [facility] fax machine. I anticipate they will contact pts PCP Olayinka Garcia on Monday when his office opens for their home health order. Advised that pt also call [facility] on Monday.

## 2023-08-19 NOTE — PLAN OF CARE
Problem: Adult Inpatient Plan of Care  Goal: Plan of Care Review  Outcome: Ongoing, Progressing  Flowsheets (Taken 8/18/2023 2145)  Plan of Care Reviewed With:   patient   daughter  Goal: Absence of Hospital-Acquired Illness or Injury  Outcome: Ongoing, Progressing  Intervention: Prevent Infection  Flowsheets (Taken 8/18/2023 2145)  Infection Prevention: equipment surfaces disinfected  Goal: Optimal Comfort and Wellbeing  Outcome: Ongoing, Progressing  Intervention: Monitor Pain and Promote Comfort  Flowsheets (Taken 8/18/2023 2145)  Pain Management Interventions:   pillow support provided   position adjusted  Intervention: Provide Person-Centered Care  Flowsheets (Taken 8/18/2023 2145)  Trust Relationship/Rapport:   care explained   questions encouraged   choices provided   reassurance provided   emotional support provided   thoughts/feelings acknowledged   empathic listening provided   questions answered

## 2023-08-23 ENCOUNTER — PATIENT OUTREACH (OUTPATIENT)
Dept: ADMINISTRATIVE | Facility: CLINIC | Age: 68
End: 2023-08-23
Payer: MEDICARE

## 2023-08-23 NOTE — PROGRESS NOTES
C3 nurse spoke with Magalis Bridges  for a TCC post hospital discharge follow up call. Requested call back. The patient has a scheduled HOSFU appointment with JACINTO Ivey  on 08/28/2023 @ 11 am.

## 2023-08-24 NOTE — PROGRESS NOTES
C3 nurse spoke with Magalis Bridges  for a TCC post hospital discharge follow up call. The patient has a scheduled HOSFU appointment with JACINTO Ivey  on 08/28/2023 @ 11 am.   Appointment with Olayinka Garcia MD on today, 08/24/2023.

## 2023-09-05 LAB
POC ACTIVATED CLOTTING TIME K: 173 SEC (ref 74–137)
POC ACTIVATED CLOTTING TIME K: 197 SEC (ref 74–137)
SAMPLE: ABNORMAL
SAMPLE: ABNORMAL

## 2023-09-06 LAB
POC ACTIVATED CLOTTING TIME K: 245 SEC (ref 74–137)
SAMPLE: ABNORMAL

## (undated) DEVICE — WIRE GUIDE INQWIRE STR 180CM

## (undated) DEVICE — SUT SILK 2-0 SH 18IN BLACK

## (undated) DEVICE — BLLN VIATRAC 6X30 135CM

## (undated) DEVICE — Device

## (undated) DEVICE — DRAPE ANGIO BRACH 38X44IN

## (undated) DEVICE — SUT MCRYL PLUS 4-0 PS2 27IN

## (undated) DEVICE — KIT GLIDESHEATH SLEND 6FR 10CM

## (undated) DEVICE — ADHESIVE DERMABOND ADVANCED

## (undated) DEVICE — GUIDEWIRE INQWIRE SE 3MM JTIP

## (undated) DEVICE — KIT SURGIFLO HEMOSTATIC MATRIX

## (undated) DEVICE — SHEATH INTRODUCER 5FR 10CM

## (undated) DEVICE — GUIDEWIRE ENROUTE .014IN 95CM

## (undated) DEVICE — PAD DEFIB CADENCE ADULT R2

## (undated) DEVICE — GUIDEWIRE AMPLATZ STIFF 260X7

## (undated) DEVICE — KIT HAND CONTROL HIGH PRESSUR

## (undated) DEVICE — SHEATH SHUTTLE 6FR 90CM

## (undated) DEVICE — CANNULA NASAL ADULT

## (undated) DEVICE — HEMOSTAT SURGICEL NU-KNIT 6X9

## (undated) DEVICE — SHEATH INTRODUCER 6FR 11CM

## (undated) DEVICE — CATH ANGIO SPR TRQ HYDRO CT 5F

## (undated) DEVICE — GUIDEWIRE AMPLATZ .035X260 SS

## (undated) DEVICE — SYS WASTE FLD DISPOSAL 1400ML

## (undated) DEVICE — SUT VICRYL 3-0 27 SH

## (undated) DEVICE — SYS ENROUTE TCAR NEURPROTCT

## (undated) DEVICE — SUT PROLENE 5/0 RB-1 36 IN

## (undated) DEVICE — DEVICE INDEFLATOR BASIX

## (undated) DEVICE — COVER PROBE US 5.5X58L NON LTX

## (undated) DEVICE — CATH VISIONS PV RX IVUS .014P

## (undated) DEVICE — KIT MANIFOLD LOW PRESS TUBING

## (undated) DEVICE — CATH EMBOSHIELD NAV6 7.2X190CM

## (undated) DEVICE — GUIDEWIRE ADVNTG 035X260CM ANG

## (undated) DEVICE — SET ANGIO ACIST CVI ANGIOTOUCH

## (undated) DEVICE — LOOP STERION MAXI YEL 1X406MM

## (undated) DEVICE — BLLN VIATRAC 5X30 135CM